# Patient Record
Sex: MALE | Race: BLACK OR AFRICAN AMERICAN | NOT HISPANIC OR LATINO | Employment: UNEMPLOYED | URBAN - METROPOLITAN AREA
[De-identification: names, ages, dates, MRNs, and addresses within clinical notes are randomized per-mention and may not be internally consistent; named-entity substitution may affect disease eponyms.]

---

## 2024-05-02 ENCOUNTER — HOSPITAL ENCOUNTER (INPATIENT)
Facility: HOSPITAL | Age: 64
LOS: 7 days | Discharge: NURSING, REHAB OR LONG-TERM CARE | DRG: 885 | End: 2024-05-09
Attending: PSYCHIATRY & NEUROLOGY | Admitting: PSYCHIATRY & NEUROLOGY
Payer: MEDICAID

## 2024-05-02 ENCOUNTER — HOSPITAL ENCOUNTER (EMERGENCY)
Facility: HOSPITAL | Age: 64
Discharge: PSYCHIATRIC OR SUBSTANCE USE FACILITY | End: 2024-05-02
Attending: EMERGENCY MEDICINE
Payer: MEDICAID

## 2024-05-02 VITALS
DIASTOLIC BLOOD PRESSURE: 99 MMHG | TEMPERATURE: 99 F | BODY MASS INDEX: 23.7 KG/M2 | WEIGHT: 175 LBS | HEIGHT: 72 IN | SYSTOLIC BLOOD PRESSURE: 165 MMHG | OXYGEN SATURATION: 97 % | RESPIRATION RATE: 18 BRPM | HEART RATE: 94 BPM

## 2024-05-02 DIAGNOSIS — F17.200 NEEDS SMOKING CESSATION EDUCATION: ICD-10-CM

## 2024-05-02 DIAGNOSIS — R45.851 SUICIDAL IDEATION: Primary | ICD-10-CM

## 2024-05-02 DIAGNOSIS — F32.A DEPRESSION: ICD-10-CM

## 2024-05-02 DIAGNOSIS — R07.9 CHEST PAIN: ICD-10-CM

## 2024-05-02 LAB
ALBUMIN SERPL-MCNC: 4.2 G/DL (ref 3.4–4.8)
ALBUMIN/GLOB SERPL: 1.3 RATIO (ref 1.1–2)
ALP SERPL-CCNC: 68 UNIT/L (ref 40–150)
ALT SERPL-CCNC: 19 UNIT/L (ref 0–55)
AMPHET UR QL SCN: NEGATIVE
APAP SERPL-MCNC: <17.4 UG/ML (ref 10–30)
APPEARANCE UR: CLEAR
AST SERPL-CCNC: 19 UNIT/L (ref 5–34)
BACTERIA #/AREA URNS AUTO: ABNORMAL /HPF
BARBITURATE SCN PRESENT UR: NEGATIVE
BASOPHILS # BLD AUTO: 0.02 X10(3)/MCL
BASOPHILS NFR BLD AUTO: 0.3 %
BENZODIAZ UR QL SCN: NEGATIVE
BILIRUB SERPL-MCNC: 0.6 MG/DL
BILIRUB UR QL STRIP.AUTO: NEGATIVE
BUN SERPL-MCNC: 23.1 MG/DL (ref 8.4–25.7)
CALCIUM SERPL-MCNC: 9.7 MG/DL (ref 8.8–10)
CANNABINOIDS UR QL SCN: NEGATIVE
CHLORIDE SERPL-SCNC: 107 MMOL/L (ref 98–107)
CO2 SERPL-SCNC: 25 MMOL/L (ref 23–31)
COCAINE UR QL SCN: POSITIVE
COLOR UR AUTO: YELLOW
CREAT SERPL-MCNC: 0.86 MG/DL (ref 0.73–1.18)
EOSINOPHIL # BLD AUTO: 0.06 X10(3)/MCL (ref 0–0.9)
EOSINOPHIL NFR BLD AUTO: 1 %
ERYTHROCYTE [DISTWIDTH] IN BLOOD BY AUTOMATED COUNT: 12.8 % (ref 11.5–17)
ETHANOL SERPL-MCNC: <10 MG/DL
FENTANYL UR QL SCN: NEGATIVE
GFR SERPLBLD CREATININE-BSD FMLA CKD-EPI: >60 MLS/MIN/1.73/M2
GLOBULIN SER-MCNC: 3.2 GM/DL (ref 2.4–3.5)
GLUCOSE SERPL-MCNC: 89 MG/DL (ref 82–115)
GLUCOSE UR QL STRIP.AUTO: NORMAL
HCT VFR BLD AUTO: 42.2 % (ref 42–52)
HGB BLD-MCNC: 14.2 G/DL (ref 14–18)
HOLD SPECIMEN: NORMAL
HYALINE CASTS #/AREA URNS LPF: ABNORMAL /LPF
IMM GRANULOCYTES # BLD AUTO: 0.01 X10(3)/MCL (ref 0–0.04)
IMM GRANULOCYTES NFR BLD AUTO: 0.2 %
KETONES UR QL STRIP.AUTO: ABNORMAL
LEUKOCYTE ESTERASE UR QL STRIP.AUTO: NEGATIVE
LYMPHOCYTES # BLD AUTO: 1.48 X10(3)/MCL (ref 0.6–4.6)
LYMPHOCYTES NFR BLD AUTO: 25.4 %
MCH RBC QN AUTO: 31.7 PG (ref 27–31)
MCHC RBC AUTO-ENTMCNC: 33.6 G/DL (ref 33–36)
MCV RBC AUTO: 94.2 FL (ref 80–94)
MDMA UR QL SCN: NEGATIVE
MONOCYTES # BLD AUTO: 0.53 X10(3)/MCL (ref 0.1–1.3)
MONOCYTES NFR BLD AUTO: 9.1 %
MUCOUS THREADS URNS QL MICRO: ABNORMAL /LPF
NEUTROPHILS # BLD AUTO: 3.72 X10(3)/MCL (ref 2.1–9.2)
NEUTROPHILS NFR BLD AUTO: 64 %
NITRITE UR QL STRIP.AUTO: NEGATIVE
NRBC BLD AUTO-RTO: 0 %
OHS QRS DURATION: 106 MS
OHS QTC CALCULATION: 488 MS
OPIATES UR QL SCN: NEGATIVE
PCP UR QL: NEGATIVE
PH UR STRIP.AUTO: 6 [PH]
PH UR: 6 [PH] (ref 3–11)
PLATELET # BLD AUTO: 306 X10(3)/MCL (ref 130–400)
PMV BLD AUTO: 9.4 FL (ref 7.4–10.4)
POTASSIUM SERPL-SCNC: 3.3 MMOL/L (ref 3.5–5.1)
PROT SERPL-MCNC: 7.4 GM/DL (ref 5.8–7.6)
PROT UR QL STRIP.AUTO: ABNORMAL
RBC # BLD AUTO: 4.48 X10(6)/MCL (ref 4.7–6.1)
RBC #/AREA URNS AUTO: ABNORMAL /HPF
RBC UR QL AUTO: ABNORMAL
SALICYLATES SERPL-MCNC: <5 MG/DL (ref 15–30)
SARS-COV-2 RDRP RESP QL NAA+PROBE: NEGATIVE
SODIUM SERPL-SCNC: 141 MMOL/L (ref 136–145)
SP GR UR STRIP.AUTO: 1.03 (ref 1–1.03)
SPECIFIC GRAVITY, URINE AUTO (.000) (OHS): 1.03 (ref 1–1.03)
SQUAMOUS #/AREA URNS LPF: ABNORMAL /HPF
UROBILINOGEN UR STRIP-ACNC: ABNORMAL
WBC # SPEC AUTO: 5.82 X10(3)/MCL (ref 4.5–11.5)
WBC #/AREA URNS AUTO: ABNORMAL /HPF

## 2024-05-02 PROCEDURE — 82077 ASSAY SPEC XCP UR&BREATH IA: CPT | Performed by: EMERGENCY MEDICINE

## 2024-05-02 PROCEDURE — 93005 ELECTROCARDIOGRAM TRACING: CPT

## 2024-05-02 PROCEDURE — 80143 DRUG ASSAY ACETAMINOPHEN: CPT | Performed by: EMERGENCY MEDICINE

## 2024-05-02 PROCEDURE — 85025 COMPLETE CBC W/AUTO DIFF WBC: CPT | Performed by: EMERGENCY MEDICINE

## 2024-05-02 PROCEDURE — 80053 COMPREHEN METABOLIC PANEL: CPT | Performed by: EMERGENCY MEDICINE

## 2024-05-02 PROCEDURE — 80307 DRUG TEST PRSMV CHEM ANLYZR: CPT | Performed by: EMERGENCY MEDICINE

## 2024-05-02 PROCEDURE — 84443 ASSAY THYROID STIM HORMONE: CPT | Performed by: PSYCHIATRY & NEUROLOGY

## 2024-05-02 PROCEDURE — 25000003 PHARM REV CODE 250: Performed by: PSYCHIATRY & NEUROLOGY

## 2024-05-02 PROCEDURE — 11400000 HC PSYCH PRIVATE ROOM

## 2024-05-02 PROCEDURE — 80179 DRUG ASSAY SALICYLATE: CPT | Performed by: EMERGENCY MEDICINE

## 2024-05-02 PROCEDURE — 87635 SARS-COV-2 COVID-19 AMP PRB: CPT | Performed by: EMERGENCY MEDICINE

## 2024-05-02 PROCEDURE — 81001 URINALYSIS AUTO W/SCOPE: CPT | Mod: XB | Performed by: EMERGENCY MEDICINE

## 2024-05-02 PROCEDURE — 99285 EMERGENCY DEPT VISIT HI MDM: CPT | Mod: 25

## 2024-05-02 RX ORDER — ALUMINUM HYDROXIDE, MAGNESIUM HYDROXIDE, AND SIMETHICONE 1200; 120; 1200 MG/30ML; MG/30ML; MG/30ML
30 SUSPENSION ORAL EVERY 6 HOURS PRN
Status: DISCONTINUED | OUTPATIENT
Start: 2024-05-02 | End: 2024-05-09 | Stop reason: HOSPADM

## 2024-05-02 RX ORDER — LORAZEPAM 2 MG/ML
2 INJECTION INTRAMUSCULAR EVERY 4 HOURS PRN
Status: DISCONTINUED | OUTPATIENT
Start: 2024-05-02 | End: 2024-05-09 | Stop reason: HOSPADM

## 2024-05-02 RX ORDER — ADHESIVE BANDAGE
30 BANDAGE TOPICAL DAILY PRN
Status: DISCONTINUED | OUTPATIENT
Start: 2024-05-02 | End: 2024-05-09 | Stop reason: HOSPADM

## 2024-05-02 RX ORDER — DIPHENHYDRAMINE HCL 50 MG
50 CAPSULE ORAL EVERY 4 HOURS PRN
Status: DISCONTINUED | OUTPATIENT
Start: 2024-05-02 | End: 2024-05-09 | Stop reason: HOSPADM

## 2024-05-02 RX ORDER — HALOPERIDOL 5 MG/ML
10 INJECTION INTRAMUSCULAR EVERY 4 HOURS PRN
Status: DISCONTINUED | OUTPATIENT
Start: 2024-05-02 | End: 2024-05-09 | Stop reason: HOSPADM

## 2024-05-02 RX ORDER — CLONIDINE HYDROCHLORIDE 0.1 MG/1
0.2 TABLET ORAL EVERY 8 HOURS PRN
Status: DISCONTINUED | OUTPATIENT
Start: 2024-05-02 | End: 2024-05-09 | Stop reason: HOSPADM

## 2024-05-02 RX ORDER — CLONIDINE HYDROCHLORIDE 0.1 MG/1
0.1 TABLET ORAL EVERY 8 HOURS PRN
Status: DISCONTINUED | OUTPATIENT
Start: 2024-05-02 | End: 2024-05-09 | Stop reason: HOSPADM

## 2024-05-02 RX ORDER — HYDROXYZINE HYDROCHLORIDE 50 MG/1
50 TABLET, FILM COATED ORAL EVERY 4 HOURS PRN
Status: DISCONTINUED | OUTPATIENT
Start: 2024-05-02 | End: 2024-05-09 | Stop reason: HOSPADM

## 2024-05-02 RX ORDER — ACETAMINOPHEN 325 MG/1
650 TABLET ORAL EVERY 6 HOURS PRN
Status: DISCONTINUED | OUTPATIENT
Start: 2024-05-02 | End: 2024-05-09 | Stop reason: HOSPADM

## 2024-05-02 RX ORDER — IBUPROFEN 200 MG
1 TABLET ORAL DAILY
Status: DISCONTINUED | OUTPATIENT
Start: 2024-05-02 | End: 2024-05-02

## 2024-05-02 RX ORDER — LORAZEPAM 1 MG/1
2 TABLET ORAL EVERY 4 HOURS PRN
Status: DISCONTINUED | OUTPATIENT
Start: 2024-05-02 | End: 2024-05-09 | Stop reason: HOSPADM

## 2024-05-02 RX ORDER — DIPHENHYDRAMINE HYDROCHLORIDE 50 MG/ML
50 INJECTION INTRAMUSCULAR; INTRAVENOUS EVERY 4 HOURS PRN
Status: DISCONTINUED | OUTPATIENT
Start: 2024-05-02 | End: 2024-05-09 | Stop reason: HOSPADM

## 2024-05-02 RX ORDER — HALOPERIDOL 5 MG/1
10 TABLET ORAL EVERY 4 HOURS PRN
Status: DISCONTINUED | OUTPATIENT
Start: 2024-05-02 | End: 2024-05-09 | Stop reason: HOSPADM

## 2024-05-02 RX ORDER — ONDANSETRON 4 MG/1
4 TABLET, ORALLY DISINTEGRATING ORAL EVERY 6 HOURS PRN
Status: DISCONTINUED | OUTPATIENT
Start: 2024-05-02 | End: 2024-05-09 | Stop reason: HOSPADM

## 2024-05-02 RX ORDER — TRAZODONE HYDROCHLORIDE 100 MG/1
100 TABLET ORAL NIGHTLY PRN
Status: DISCONTINUED | OUTPATIENT
Start: 2024-05-02 | End: 2024-05-09 | Stop reason: HOSPADM

## 2024-05-02 RX ADMIN — TRAZODONE HYDROCHLORIDE 100 MG: 100 TABLET ORAL at 08:05

## 2024-05-02 RX ADMIN — ALUMINUM HYDROXIDE, MAGNESIUM HYDROXIDE, AND SIMETHICONE 30 ML: 200; 200; 20 SUSPENSION ORAL at 12:05

## 2024-05-02 RX ADMIN — CLONIDINE HYDROCHLORIDE 0.1 MG: 0.1 TABLET ORAL at 12:05

## 2024-05-02 NOTE — PLAN OF CARE
Behavioral Health Unit  Psychosocial History and Assessment  Progress Note      Patient Name: Oli Sanderson YOB: 1960 SW: Keerthi Sanders Date: 2024    Chief Complaint: addictive disorder and depression    Consent:     Did the patient consent for an interview with the ? Yes    Did the patient consent for the  to contact family/friend/caregiver?   Yes  Name: Zoey Coffey, Relationship: sister, and Contact: [phone].    Did the patient give consent for the  to inform family/friend/caregiver of his/her whereabouts or to discuss discharge planning? Yes    Source of Information: Face to face with patient    Is information obtained from interviews considered reliable?   yes    Reason for Admission:     There are no hospital problems to display for this patient.      History of Present Illness - (Patient Perception):   I've been depressed, smoking crack, and drinking. I live by myself, got a nice house, but I'm lonely. I don't tell nobody, because everybody got their own things going on. I feel like I got stuck in that house. I realize I'm turning 64, about to get my nursing home and I can't keep smoking. I need help. I was smoking every day, I was 89, now I'm 89. I'm not doing nothing in the house, its starting to go down. I left my good friends for the dark friends. I don't think I really wanted to kill myself, I just didn't care. I drink like a 6-pack a day. It's hard, I know I can't do this by myself.     Present biopsychosocial functioning: quiet, depressed    Past biopsychosocial functioning: history of depression    Family and Marital/Relationship History:     Significant Other/Partner Relationships:  Single:  6 children, 1     Family Relationships: Strained and I don't like to bother them; they have their own stuff going on      Childhood History:     Where was [place] raised[place]    Who raised the patient? parents      How does patient describe  their childhood? great      Who is patient's primary support person? sister      Culture and Christianity:     Christianity: Jehovah's witness    How strong of a role does Orthodox and spirituality play in patient's life? Not strong    Taoism or spiritual concerns regarding treatment: observation of holy days  and spiritual concerns / distress    History of Abuse:   History of Abuse: Denies      Outcome: denies    Psychiatric and Medical History:     History of psychiatric illness or treatment: prior inpatient treatment and prior suicide attempt(s)    Medical history:   Past Medical History:   Diagnosis Date    Hypertension        Substance Abuse History:     Alcohol - (Patient Perspective): pt states that he drinks a six pack of beer daily  Social History     Substance and Sexual Activity   Alcohol Use Yes    Comment: DAILY BEER       Drugs - (Patient Perspective): Pt states that he has been smoking crack daily  Social History     Substance and Sexual Activity   Drug Use Yes    Types: Cocaine    Comment: DAILY       Education:     Currently Enrolled? No  High School (9-12) or GED dropped out in 9th grade    Special Education? No    Interested in Completing Education/GED: No    Employment and Financial:     Currently employed? Employed: Current Occupation: [employer]    Source of Income: salary    Able to afford basic needs (food, shelter, utilities)? Yes    Who manages finances/personal affairs? self      Service:     [place]? no    Combat Service? No     Community Resources:     Describe present use of community resources: inpatient mental health     Identify previously used community resources   (Include previous mental health treatment - outpatient and inpatient): inpatient mental health    Environmental:     Current living situation:Lives alone [address], [place]    Social Evaluation:     Patient Assets: General fund of knowledge, Motivation for treatment/growth, and Capable of independent living    Patient  Limitations: poor coping skills; potential for relapse    High risk psychosocial issues that may impact discharge planning:   inability to afford medications or follow up outpatient treatment    Recommendations:     Anticipated discharge plan:   Pt is requesting Rehab; does not currently have medicaid    High risk issues requiring early treatment planning and immediate intervention: none at this time    Community resources needed for discharge planning:  aftercare treatment sources    Anticipated social work role(s) in treatment and discharge planning: advice and [place], groups, individual as needed, and referral to aftercare.    05/02/24 1136   Initial Information   Source of Information patient   Reason for Admission deression, SI   Patient Aware of Diagnosis yes   Arrived From emergency department   Spiritual Beliefs   Spiritual, Cultural Beliefs, Mandaeism Practices, Values that Affect Care yes   Description of Beliefs that Will Affect Care Gnosticist   Malnutrition Screening Tool   Have you recently lost weight without trying? 3   Weight loss score 3   Have you been eating poorly because of a decreased appetite? 1   Appetite score 1   MST Score 4   Substance Use/Withdrawal   Substance Use Current, used prior to admission   Additional Tobacco Use   How many cigarettes do you typically have per day? 1   Abuse Screen (yes response referral indicated)   Feels Unsafe at Home or Work/School no   Feels Threatened by Someone no   Does anyone try to keep you from having contact with others or doing things outside your home? no   Physical Signs of Abuse Present no   Abuse Details   Physical Abuse No   Sexual Abuse No   Emotional Abuse No   If applicable, has the abuse been reported? No   AUDIT-C (Alcohol Use Disorders ID Test)   Alcohol Use In Past Year 4-->four or more times a week   Alcohol Amount Per Day In Past Year 2-->five or six   More Than 6 Drinks On One Occasion In Past Year 4-->daily or almost daily   Total  Audit C Score 10

## 2024-05-02 NOTE — NURSING
"Admission Note:    Oli Sanderson is a 63 y.o. male, : 1960, MRN: [id number], admitted on 2024 to [facility] ([facility]) for Boogie Lees MD with a diagnosis of Depression [F32.A]. Patient admitted on a status of Physician Emergency Certificate (PEC). Oli reports no known food or drug allergies.    Patient demonstrated an affect that was sad, flat, and anxious. Patient demonstrated mood during assessment that was depressed and anxious. Patient had an appearance that was disheveled.  Patient endorses suicidal ideation. Patient endorses suicide plan. He informed ER Staff that he had consumed 27 OTC sleep aid. He had taken these pills at 3AM. He has been monitored in ER for 7 hours and levels are WNL. Patient denies hallucinations.    Oli's  height is 7' 5" (2.261 m) and weight is 70.9 kg (156 lb 3.2 oz). His temperature is 98.2 °F (36.8 °C). His blood pressure is 160/100 (abnormal) and his pulse is 80. His respiration is 20.     Oli's last BM was noted on: 24.    Metal detector screening performed via security personnel. The result of the scan was no contraband found. Head-to-toe physical assessment completed with the following findings: no wounds found upon body screen. A full skin assessment was performed. Oli's skin appeared warm, dry, and intact.  Oli was oriented to unit, staff, peers, and room. Patient belongings/valuables stored in locked intake room cabinet and changes of clothing provided to patient. Oli was placed on Q 15 min observations.       "

## 2024-05-02 NOTE — H&P
"[facility] - Behavioral Health Unit  History & Physical    Subjective:      Chief Complaint/Reason for Admission: suicidal ideations     Oli Sanderson is a 63 y.o. male. Suicidal ideation/ crack cocaine/ alcohol abuse     Past Medical History:   Diagnosis Date    Hypertension      No past surgical history on file.  No family history on file.  Social History     Tobacco Use    Smoking status: Every Day     Current packs/day: 0.10     Types: Cigarettes    Smokeless tobacco: Never   Substance Use Topics    Alcohol use: Yes     Alcohol/week: 6.0 standard drinks of alcohol     Types: 6 Cans of beer per week     Comment: DAILY BEER    Drug use: Yes     Types: Cocaine, "Crack" cocaine     Comment: DAILY       Current Facility-Administered Medications   Medication Dose Route Frequency Provider Last Rate Last Admin    acetaminophen tablet 650 mg  650 mg Oral Q6H PRN Boogie Lees MD        aluminum-magnesium hydroxide-simethicone 200-200-20 mg/5 mL suspension 30 mL  30 mL Oral Q6H PRN Boogie Lees MD        cloNIDine tablet 0.1 mg  0.1 mg Oral Q8H PRBoogie Farley MD        cloNIDine tablet 0.2 mg  0.2 mg Oral Q8H PRN Boogie Lees MD        haloperidoL tablet 10 mg  10 mg Oral Q4H PRBoogie Farley MD        And    diphenhydrAMINE capsule 50 mg  50 mg Oral Q4H PRBoogie Farley MD        And    LORazepam tablet 2 mg  2 mg Oral Q4H PRBoogie Farley MD        And    haloperidol lactate injection 10 mg  10 mg Intramuscular Q4H PRBoogie Farley MD        And    diphenhydrAMINE injection 50 mg  50 mg Intramuscular Q4H PRBoogie Farley MD        And    LORazepam injection 2 mg  2 mg Intramuscular Q4H PRBoogie Farley MD        hydrOXYzine tablet 50 mg  50 mg Oral Q4H PRBoogie Farley MD        magnesium hydroxide 400 mg/5 ml suspension 2,400 mg  30 mL Oral Daily PRN Boogie Lees MD        ondansetron " disintegrating tablet 4 mg  4 mg Oral Q6H PRN Boogie Lees MD        traZODone tablet 100 mg  100 mg Oral Nightly PRN Boogie Lees MD         Review of patient's allergies indicates:  No Known Allergies     Review of Systems   Constitutional: Negative.    HENT: Negative.     Eyes: Negative.    Respiratory: Negative.     Cardiovascular: Negative.    Gastrointestinal: Negative.    Genitourinary: Negative.    Musculoskeletal: Negative.    Skin: Negative.    Neurological: Negative.    Endo/Heme/Allergies: Negative.    Psychiatric/Behavioral:  Positive for depression, substance abuse and suicidal ideas. Negative for hallucinations.        Objective:      Vital Signs (Most Recent)  Temp: 98.2 °F (36.8 °C) (05/02/24 1102)  Pulse: 80 (05/02/24 1102)  Resp: 20 (05/02/24 1102)  BP: (!) 160/100 (05/02/24 1102)    Vital Signs Range (Last 24H):  Temp:  [98.2 °F (36.8 °C)-98.8 °F (37.1 °C)]   Pulse:  [70-94]   Resp:  [18-20]   BP: (150-165)/()   SpO2:  [97 %-98 %]     Physical Exam  HENT:      Head: Normocephalic.      Right Ear: Tympanic membrane normal.      Left Ear: Tympanic membrane normal.      Nose: Nose normal.      Mouth/Throat:      Mouth: Mucous membranes are moist.   Eyes:      Extraocular Movements: Extraocular movements intact.      Pupils: Pupils are equal, round, and reactive to light.   Cardiovascular:      Rate and Rhythm: Normal rate and regular rhythm.   Pulmonary:      Effort: Pulmonary effort is normal.   Abdominal:      General: Abdomen is flat.   Musculoskeletal:         General: Normal range of motion.   Skin:     General: Skin is warm.   Neurological:      General: No focal deficit present.      Mental Status: He is alert and oriented to person, place, and time.      Comments: Vision normal   Hearing normal   EOM intact   Face muscles normal  Facial sensation normal   Shrugs shoulders  Tongue midline            Data Review:    Recent Results (from the past 48 hour(s))   EKG  12-lead (Chest Pain) Age >30    Collection Time: 05/02/24  7:42 AM   Result Value Ref Range    QRS Duration 106 ms    [facility] QTC Calculation 488 ms   COVID-19 Rapid Screening    Collection Time: 05/02/24  7:59 AM   Result Value Ref Range    SARS COV-2 MOLECULAR Negative Negative   Ethanol    Collection Time: 05/02/24  8:03 AM   Result Value Ref Range    Ethanol Level <10.0 <=10.0 mg/dL   Salicylate Level    Collection Time: 05/02/24  8:03 AM   Result Value Ref Range    Salicylate Level <5.0 (L) 15.0 - 30.0 mg/dL   Acetaminophen Level    Collection Time: 05/02/24  8:03 AM   Result Value Ref Range    Acetaminophen Level <17.4 10.0 - 30.0 ug/ml   Comprehensive Metabolic Panel    Collection Time: 05/02/24  8:03 AM   Result Value Ref Range    Sodium Level 141 136 - 145 mmol/L    Potassium Level 3.3 (L) 3.5 - 5.1 mmol/L    Chloride 107 98 - 107 mmol/L    Carbon Dioxide 25 23 - 31 mmol/L    Glucose Level 89 82 - 115 mg/dL    Blood Urea Nitrogen 23.1 8.4 - 25.7 mg/dL    Creatinine 0.86 0.73 - 1.18 mg/dL    Calcium Level Total 9.7 8.8 - 10.0 mg/dL    Protein Total 7.4 5.8 - 7.6 gm/dL    Albumin Level 4.2 3.4 - 4.8 g/dL    Globulin 3.2 2.4 - 3.5 gm/dL    Albumin/Globulin Ratio 1.3 1.1 - 2.0 ratio    Bilirubin Total 0.6 <=1.5 mg/dL    Alkaline Phosphatase 68 40 - 150 unit/L    Alanine Aminotransferase 19 0 - 55 unit/L    Aspartate Aminotransferase 19 5 - 34 unit/L    eGFR >60 mls/min/1.73/m2   CBC with Differential    Collection Time: 05/02/24  8:03 AM   Result Value Ref Range    WBC 5.82 4.50 - 11.50 x10(3)/mcL    RBC 4.48 (L) 4.70 - 6.10 x10(6)/mcL    Hgb 14.2 14.0 - 18.0 g/dL    Hct 42.2 42.0 - 52.0 %    MCV 94.2 (H) 80.0 - 94.0 fL    MCH 31.7 (H) 27.0 - 31.0 pg    MCHC 33.6 33.0 - 36.0 g/dL    RDW 12.8 11.5 - 17.0 %    Platelet 306 130 - 400 x10(3)/mcL    MPV 9.4 7.4 - 10.4 fL    Neut % 64.0 %    Lymph % 25.4 %    Mono % 9.1 %    Eos % 1.0 %    Basophil % 0.3 %    Lymph # 1.48 0.6 - 4.6 x10(3)/mcL    Neut # 3.72 2.1 - 9.2  x10(3)/mcL    Mono # 0.53 0.1 - 1.3 x10(3)/mcL    Eos # 0.06 0 - 0.9 x10(3)/mcL    Baso # 0.02 <=0.2 x10(3)/mcL    IG# 0.01 0 - 0.04 x10(3)/mcL    IG% 0.2 %    NRBC% 0.0 %   Light Blue Top Hold    Collection Time: 05/02/24  8:06 AM   Result Value Ref Range    Extra Tube Hold for add-ons.    Gold Top Hold    Collection Time: 05/02/24  8:06 AM   Result Value Ref Range    Extra Tube Hold for add-ons.    Pink Top Hold    Collection Time: 05/02/24  8:06 AM   Result Value Ref Range    Extra Tube Hold for add-ons.    Light Green Top Hold    Collection Time: 05/02/24  8:08 AM   Result Value Ref Range    Extra Tube Hold for add-ons.    Drug Screen, Urine    Collection Time: 05/02/24  8:27 AM   Result Value Ref Range    Amphetamines, Urine Negative Negative    Barbituates, Urine Negative Negative    Benzodiazepine, Urine Negative Negative    Cannabinoids, Urine Negative Negative    Cocaine, Urine Positive (A) Negative    Fentanyl, Urine Negative Negative    MDMA, Urine Negative Negative    Opiates, Urine Negative Negative    Phencyclidine, Urine Negative Negative    pH, Urine 6.0 3.0 - 11.0    Specific Gravity, Urine Auto 1.034 1.001 - 1.035   Urinalysis, Reflex to Urine Culture    Collection Time: 05/02/24  8:27 AM    Specimen: Urine   Result Value Ref Range    Color, UA Yellow Yellow, Light-Yellow, Dark Yellow, Neha, Straw    Appearance, UA Clear Clear    Specific Gravity, UA 1.034 (H) 1.005 - 1.030    pH, UA 6.0 5.0 - 8.5    Protein, UA Trace (A) Negative    Glucose, UA Normal Negative, Normal    Ketones, UA Trace (A) Negative    Blood, UA 2+ (A) Negative    Bilirubin, UA Negative Negative    Urobilinogen, UA 1+ (A) 0.2, 1.0, Normal    Nitrites, UA Negative Negative    Leukocyte Esterase, UA Negative Negative    WBC, UA 0-5 None Seen, 0-2, 3-5, 0-5 /HPF    Bacteria, UA Trace (A) None Seen /HPF    Squamous Epithelial Cells, UA None Seen None Seen /HPF    Mucous, UA Many (A) None Seen /LPF    Hyaline Casts, UA 0-2 (A) None  Seen /lpf    RBC, UA 21-50 (A) None Seen, 0-2, 3-5, 0-5 /HPF        No results found.       Assessment and Plan       Substance abuse  Major depression

## 2024-05-02 NOTE — PLAN OF CARE
Problem: Adult Behavioral Health Plan of Care  Goal: Plan of Care Review  Outcome: Not Progressing  Goal: Patient-Specific Goal (Individualization)  Outcome: Not Progressing  Goal: Adheres to Safety Considerations for Self and Others  Outcome: Not Progressing  Goal: Absence of New-Onset Illness or Injury  Outcome: Not Progressing  Goal: Optimized Coping Skills in Response to Life Stressors  Outcome: Not Progressing  Goal: Develops/Participates in Therapeutic [place] to Support Successful Transition  Outcome: Not Progressing  Goal: Rounds/Family Conference  Outcome: Not Progressing     Problem: Depressive Signs/Symptoms  Goal: Optimized Energy Level (Depressive Signs/Symptoms)  Outcome: Not Progressing  Goal: Optimized Cognitive Function (Depressive Signs/Symptoms)  Outcome: Not Progressing  Goal: Increased Participation and Engagement (Depressive Signs/Symptoms)  Outcome: Not Progressing  Goal: Enhanced Self-Esteem and Confidence (Depressive Signs/Symptoms)  Outcome: Not Progressing  Goal: Improved Mood Symptoms (Depressive Signs/Symptoms)  Outcome: Not Progressing  Goal: Optimized Nutrition Intake (Depressive Signs/Symptoms)  Outcome: Not Progressing  Goal: Improved Psychomotor Symptoms (Depressive Signs/Symptoms)  Outcome: Not Progressing  Goal: Improved Sleep (Depressive Signs/Symptoms)  Outcome: Not Progressing  Goal: Enhanced Social, Occupational or Functional Skills (Depressive Signs/Symptoms)  Outcome: Not Progressing     Problem: Excessive Substance Use  Goal: Optimized Energy Level (Excessive Substance Use)  Outcome: Not Progressing  Goal: Improved Behavioral Control (Excessive Substance Use)  Outcome: Not Progressing  Goal: Increased Participation and Engagement (Excessive Substance Use)  Outcome: Not Progressing  Goal: Improved Physiologic Symptoms (Excessive Substance Use)  Outcome: Not Progressing  Goal: Enhanced Social, Occupational or Functional Skills (Excessive Substance Use)  Outcome: Not  Progressing

## 2024-05-02 NOTE — ED PROVIDER NOTES
"Encounter Date: 5/2/2024       History     Chief Complaint   Patient presents with    Suicide Attempt     Pt in /[facility] w report of taking 27 OTC sleeping pills approx. 4 hrs ago. Pt stated "I just wanted to sleep and not wake up"  -HI-AH-.  PT A&O X 4, CO CP.  EKG OBTAINED.   PT WANDED.      Patient is here endorsing suicidal ideation with plan to overdose; patient's cognition sufficient to enable access to these means; affect is rather flat.  There is previous somewhat distant previous admission for suicidal ideation.  Patient recalls that admission was helpful to.  Patient is without nausea, vomiting, abdominal.  Patient is otherwise without somatic complaints of any kind.        Review of patient's allergies indicates:  No Known Allergies  Past Medical History:   Diagnosis Date    Hypertension      No past surgical history on file.  No family history on file.  Social History     Tobacco Use    Smoking status: Every Day     Current packs/day: 1.00     Types: Cigarettes    Smokeless tobacco: Never   Substance Use Topics    Alcohol use: Yes     Comment: DAILY BEER    Drug use: Yes     Types: Cocaine     Comment: DAILY     Review of Systems    Physical Exam     Initial Vitals [05/02/24 0746]   BP Pulse Resp Temp SpO2   (!) 150/92 70 18 98.8 °F (37.1 °C) 98 %      MAP       --         Physical Exam    Nursing note and vitals reviewed.  Constitutional: He appears well-developed and well-nourished. He is not diaphoretic. No distress.   HENT:   Head: Normocephalic and atraumatic.   Eyes: EOM are normal. Pupils are equal, round, and reactive to light. Right eye exhibits no discharge. Left eye exhibits no discharge.   Neck: Neck supple. No thyromegaly present. No tracheal deviation present. No JVD present.   Normal range of motion.  Cardiovascular:  Normal rate, regular rhythm, normal heart sounds and intact distal pulses.           No murmur heard.  Pulmonary/Chest: Breath sounds normal. No stridor. No respiratory " distress. He has no wheezes. He has no rhonchi. He has no rales.   Abdominal: Abdomen is soft. He exhibits no distension. There is no abdominal tenderness. There is no rebound and no guarding.   Musculoskeletal:         General: No tenderness or edema. Normal range of motion.      Cervical back: Normal range of motion and neck supple.     Neurological: He is alert and oriented to person, place, and time. He has normal strength. No cranial nerve deficit. GCS score is 15. GCS eye subscore is 4. GCS verbal subscore is 5. GCS motor subscore is 6.   Skin: Skin is warm and dry. Capillary refill takes less than 2 seconds. No rash and no abscess noted. No erythema. No pallor.   Psychiatric:   Suicidal ideation, plan intact, access to means; judgment and impulse control sufficiently impaired patient unable to contract for safety;         ED Course   Procedures  Labs Reviewed   SALICYLATE LEVEL - Abnormal; Notable for the following components:       Result Value    Salicylate Level <5.0 (*)     All other components within normal limits   COMPREHENSIVE METABOLIC PANEL - Abnormal; Notable for the following components:    Potassium Level 3.3 (*)     All other components within normal limits   CBC WITH DIFFERENTIAL - Abnormal; Notable for the following components:    RBC 4.48 (*)     MCV 94.2 (*)     MCH 31.7 (*)     All other components within normal limits   SARS-COV-2 RNA AMPLIFICATION, QUAL - Normal    Narrative:     The IDNOW COVID-19 assay is a rapid molecular in vitro diagnostic test utilizing an isothermal nucleic acid amplification technology intended for the qualitative detection of nucleic acid from the SARS-CoV-2 viral RNA in direct nasal, nasopharyngeal or throat swabs from individuals who are suspected of COVID-19 by their healthcare provider.   ALCOHOL,MEDICAL (ETHANOL) - Normal   ACETAMINOPHEN LEVEL - Normal   CBC W/ AUTO DIFFERENTIAL    Narrative:     The following orders were created for panel order CBC Auto  Differential.  Procedure                               Abnormality         Status                     ---------                               -----------         ------                     CBC with Differential[[id number]]       Abnormal            Final result                 Please view results for these tests on the individual orders.   DRUG SCREEN, URINE (BEAKER)   URINALYSIS, REFLEX TO URINE CULTURE   EXTRA TUBES    Narrative:     The following orders were created for panel order EXTRA TUBES.  Procedure                               Abnormality         Status                     ---------                               -----------         ------                     Light Blue Top Hold[[id number]]                             In process                 Gold Top Hold[[id number]]                                   In process                 Pink Top Hold[[id number]]                                   In process                   Please view results for these tests on the individual orders.   LIGHT BLUE TOP HOLD   GOLD TOP HOLD   PINK TOP HOLD   EXTRA TUBES    Narrative:     The following orders were created for panel order EXTRA TUBES.  Procedure                               Abnormality         Status                     ---------                               -----------         ------                     Light Green Top Hold[[id number]]                            In process                   Please view results for these tests on the individual orders.   LIGHT GREEN TOP HOLD     EKG Readings: (Independently Interpreted)   NSR @ 86, non acute, non ischemic, normal intervals ;     ECG Results              EKG 12-lead (Chest Pain) Age >30 (In process)        Collection Time Result Time QRS Duration OHS QTC Calculation    05/02/24 07:42:34 05/02/24 07:45:14 106 488                     In process by Interface, [facility] (05/02/24 07:45:17)                   Narrative:    Test Reason : R07.9,    Vent. Rate : 086 BPM      Atrial Rate : 086 BPM     P-R Int : 140 ms          QRS Dur : 106 ms      QT Int : 408 ms       P-R-T Axes : 004 049 080 degrees     QTc Int : 488 ms    Normal sinus rhythm  Possible Left atrial enlargement  LVH  Nonspecific ST and T wave abnormality  Prolonged QT  Abnormal ECG  No previous ECGs available    Referred By:             Confirmed By:                                   Imaging Results    None          Medications - No data to display  Medical Decision Making  Patient here with findings most compatible with suicidal ideation.  Differential diagnosis includes decompensated mental health condition, consequences of polysubstance abuse, organic decompensation, others ....    Amount and/or Complexity of Data Reviewed  External Data Reviewed: notes.     Details: Records confirm history of a previous psychiatric decompensation and subsequent admission;  Labs: ordered. Decision-making details documented in ED Course.     Details: As above;  ECG/medicine tests: ordered and independent interpretation performed. Decision-making details documented in ED Course.     Details: NSR @ 86, non acute, non ischemic, normal intervals ;  Discussion of management or test interpretation with external provider(s): Formal mental health evaluation;    Risk  Decision regarding hospitalization.  Risk Details: Risk found sufficient to warrant admission for formal mental health evaluation; patient achieves medical clearance and is referred for formal mental health evaluation; transferred in stable condition without event.               ED Course as of 05/02/24 0840   Thu May 02, 2024   0839 Negative salicylates, negative Tylenol, negative ethanol; [CT]   0839 Generally reassuring chemistries; [CT]   0839 Negative Abbott now; [CT]   0839 Reassuring hemogram; [CT]      ED Course User Index  [CT] Sean Carvajal MD       Medically cleared for psychiatry placement: 5/2/2024  8:40 AM                   Clinical Impression:  Final  diagnoses:  [R07.9] Chest pain  [R45.851] Suicidal ideation (Primary)          ED Disposition Condition    Transfer to Psych Facility Stable          ED Prescriptions    None       Follow-up Information    None          Sean Carvajal MD  05/02/24 0840

## 2024-05-02 NOTE — PROGRESS NOTES
05/02/24 1500   [facility]   Group Name Therapeutic Recreation   Specific Interventions Skilled Activity Creative Expression   Participation Level Active;Supportive;Appropriate;Attentive;Sharing   Participation Quality Cooperative;Social   Insight/Motivation Limited;Applies New Skills   Affect/Mood Display Appropriate   Cognition Oriented;Alert   Psychomotor WNL

## 2024-05-02 NOTE — NURSING
Patient given Aluminum magnesium hydroxide simethicone at 1230 pm for complaints of indigestion, acid like feeling in stomach . An hour after medication given stated relief.

## 2024-05-02 NOTE — PLAN OF CARE
05/02/24 1444   [facility] Group Therapy   Group Name Medication   Specific Interventions Other (see comments)  (Medication Adherence)   Participation Level Minimal   Participation Quality Lack of Interest   Insight/Motivation None   Affect/Mood Display Flat   Cognition Pre-Occupied   Psychomotor WNL

## 2024-05-03 LAB
CHOLEST SERPL-MCNC: 161 MG/DL
CHOLEST/HDLC SERPL: 3 {RATIO} (ref 0–5)
EST. AVERAGE GLUCOSE BLD GHB EST-MCNC: 91.1 MG/DL
GLUCOSE P FAST SERPL-MCNC: 84 MG/DL (ref 70–100)
HBA1C MFR BLD: 4.8 %
HDLC SERPL-MCNC: 61 MG/DL (ref 35–60)
LDLC SERPL CALC-MCNC: 86 MG/DL (ref 50–140)
T PALLIDUM AB SER QL: REACTIVE
TRIGL SERPL-MCNC: 68 MG/DL (ref 34–140)
TSH SERPL-ACNC: 0.95 UIU/ML (ref 0.35–4.94)
VLDLC SERPL CALC-MCNC: 14 MG/DL

## 2024-05-03 PROCEDURE — 86592 SYPHILIS TEST NON-TREP QUAL: CPT | Performed by: PSYCHIATRY & NEUROLOGY

## 2024-05-03 PROCEDURE — 82947 ASSAY GLUCOSE BLOOD QUANT: CPT | Performed by: PSYCHIATRY & NEUROLOGY

## 2024-05-03 PROCEDURE — 25000003 PHARM REV CODE 250: Performed by: PSYCHIATRY & NEUROLOGY

## 2024-05-03 PROCEDURE — 86780 TREPONEMA PALLIDUM: CPT | Performed by: PSYCHIATRY & NEUROLOGY

## 2024-05-03 PROCEDURE — 80061 LIPID PANEL: CPT | Performed by: PSYCHIATRY & NEUROLOGY

## 2024-05-03 PROCEDURE — 83036 HEMOGLOBIN GLYCOSYLATED A1C: CPT | Performed by: PSYCHIATRY & NEUROLOGY

## 2024-05-03 PROCEDURE — 11400000 HC PSYCH PRIVATE ROOM

## 2024-05-03 PROCEDURE — 25000003 PHARM REV CODE 250

## 2024-05-03 RX ORDER — ESCITALOPRAM OXALATE 10 MG/1
10 TABLET ORAL DAILY
Status: DISCONTINUED | OUTPATIENT
Start: 2024-05-03 | End: 2024-05-06

## 2024-05-03 RX ADMIN — HYDROXYZINE HYDROCHLORIDE 50 MG: 50 TABLET, FILM COATED ORAL at 10:05

## 2024-05-03 RX ADMIN — ALUMINUM HYDROXIDE, MAGNESIUM HYDROXIDE, AND SIMETHICONE 30 ML: 200; 200; 20 SUSPENSION ORAL at 04:05

## 2024-05-03 RX ADMIN — ALUMINUM HYDROXIDE, MAGNESIUM HYDROXIDE, AND SIMETHICONE 30 ML: 200; 200; 20 SUSPENSION ORAL at 08:05

## 2024-05-03 RX ADMIN — TRAZODONE HYDROCHLORIDE 100 MG: 100 TABLET ORAL at 08:05

## 2024-05-03 RX ADMIN — HYDROXYZINE HYDROCHLORIDE 50 MG: 50 TABLET, FILM COATED ORAL at 08:05

## 2024-05-03 RX ADMIN — ESCITALOPRAM OXALATE 10 MG: 10 TABLET ORAL at 11:05

## 2024-05-03 RX ADMIN — CLONIDINE HYDROCHLORIDE 0.2 MG: 0.1 TABLET ORAL at 04:05

## 2024-05-03 RX ADMIN — ACETAMINOPHEN 650 MG: 325 TABLET, FILM COATED ORAL at 08:05

## 2024-05-03 NOTE — CARE UPDATE
Due to pt not having insurance at this time transition of care will be set up with [facility].  is actively working with belen rep on pt belen eligibility.

## 2024-05-03 NOTE — PROGRESS NOTES
05/03/24 1400   [facility] Group Therapy   Group Name Therapeutic Recreation   Specific Interventions Skilled Activity Leisure Education and Awareness   Participation Level Active;Supportive;Appropriate;Attentive;Sharing   Participation Quality Cooperative;Social   Insight/Motivation Improved;Applies New Skills   Affect/Mood Display Appropriate;Bright   Cognition Oriented;Alert   Psychomotor WNL

## 2024-05-03 NOTE — NURSING
"PRN Administration Note:    Behavior:    Patient (Oli Sanderson is a 63 y.o. male, : 1960, MRN: [id number])     Allergies: Patient has no known allergies.    Oli's  height is 7' 5" (2.261 m) and weight is 70.9 kg (156 lb 3.2 oz). His temperature is 98.6 °F (37 °C). His blood pressure is 131/95 (abnormal) and his pulse is 87. His respiration is 16 and oxygen saturation is 96%.     Reason for PRN Administration: headache and heartburn.    Intervention:    Administered Tylenol 650 mg PO PRN and Maalox 30 mg PO PRN per physician order to Oli       Response:    Oli tolerated administration well.      Plan:     Continue to monitor per MD/PA/APRN orders; and reevaluate medication effectiveness within 30 minutes.    "

## 2024-05-03 NOTE — PROGRESS NOTES
Oli is a 64y/o male admitted for Major Depressive Disorder, Recurrent: Severe Without Psychotic Features (F33.2) and Alcohol Abuse Without Physiological Dependence with a uds +cocaine. CTRS met with Pt 1:1, Oli was cooperative, appearing depressed reporting low self-esteem and ability to perform his ADL's. CTRS educated Pt to TR group times and dates, with Oli agreeing to attend TR groups. Oli reported his treatment goals as get sober and going long term(rehab).       05/02/24 1418   General   Admit Date 05/02/24   Primary Diagnosis Major Depressive Disorder, Recurrent: Severe Without Psychotic Features (F33.2)   Secondary Diagnosis Alcohol Abuse Without Physiological Dependence   Z[employer]ism [employer]   Number of Children 7   Children Living? 6   Occupation unemployed   Does the patient have dentures? No   If you were to take part in activities, which of the following would you prefer? Activities that would bring you together with other patients   Do you feel like you have enough to keep you busy now? Yes   Do you believe that you have the opportunity for physical activity? Yes   Activity Capabilities Moderate   Subjective   Patient states Drugs and took some pills, suicide, I was depressed   Assessment   Mobility ambulates independently   Transfers independently   Musculoskeletal pain  (c/o B LE pain)   Visual Acuity normal vision   Visual Perception depth perception;color perception;recognizes letters;recognizes numbers   Hearing normal   Speech/Communication normal   Cognitive Concerns oriented x4   Emotional Concerns appears depressed   Leisure Interest Survey   Leisure Interest Survey Yes   Creative Activities   Cooking Current Interest   Outdoor Activities   Hunting Current Interest   Picnics/Cookouts Current Interest   Fishing Current Interest   Horseback Riding Current Interest   Spectator Events   Concerts Past Interest   Plays Past Interest   Movies Past Interest   Sporting Events Past Interest    Passive Games   Classic Board Games Current Interest   Other Passive Games   (table games)   Goals   Additional Documentation yes   Goal Formulation With patient   Time For Goal Achievement 7 days   Goal 1 Get sober and go to long term (rehab)   Goal 1-Progress ongoing-   Plan   Planned Therapy Intervention Group Recreational Therapy   Expected Length of Stay 5-7days   PT Frequency Minimum of 3 visits per week

## 2024-05-03 NOTE — PROGRESS NOTES
Inpatient Nutrition Assessment    Admit Date: 5/2/2024   Total duration of encounter: 1 day   Patient Age: 63 y.o.    Nutrition Recommendation/Prescription     Continue Regular diet, Double portions as tolerated  Boost Plus TID (provides 360 kcal, 14 g protein per serving)  Consider daily MVI, folic acid, thiamine supplementation -- etoh use  Monitor po intake, wt, labs    Communication of Recommendations: reviewed with nurse and reviewed with patient    Nutrition Assessment     Malnutrition Assessment/Nutrition-Focused Physical Exam    Malnutrition Context: social/environmental circumstances (05/03/24 1212)  Malnutrition Level:  (Unable to determine) (05/03/24 1212)  Energy Intake (Malnutrition): less than or equal to 75% for greater than or equal to 1 month (05/03/24 1212)  Weight Loss (Malnutrition):  (Unable to determine) (05/03/24 1212)  Subcutaneous Fat (Malnutrition):  (Unable to determine) (05/03/24 1212)           Muscle Mass (Malnutrition):  (Unable to determine) (05/03/24 1212)                                   A minimum of two characteristics is recommended for diagnosis of either severe or non-severe malnutrition.    Chart Review    Reason Seen: malnutrition screening tool (MST)    Malnutrition Screening Tool Results   Have you recently lost weight without trying?: Yes: 24-33 lbs  Have you been eating poorly because of a decreased appetite?: Yes   MST Score: 4     Diagnosis: SUBSTANCE-RELATED DISORDERS; Alcohol-Related Disorders; Alcohol Abuse Without Physiological Dependence  and MOOD DISORDERS; Major Depressive Disorder, Recurrent: Severe Without Psychotic Features     Relevant Medical History: HTN    Scheduled Medications:  EScitalopram oxalate, 10 mg, Daily    Continuous Infusions:   PRN Medications:   Current Facility-Administered Medications:     acetaminophen, 650 mg, Oral, Q6H PRN    aluminum-magnesium hydroxide-simethicone, 30 mL, Oral, Q6H PRN    cloNIDine, 0.1 mg, Oral, Q8H PRN    cloNIDine,  0.2 mg, Oral, Q8H PRN    haloperidoL, 10 mg, Oral, Q4H PRN **AND** diphenhydrAMINE, 50 mg, Oral, Q4H PRN **AND** LORazepam, 2 mg, Oral, Q4H PRN **AND** haloperidol lactate, 10 mg, Intramuscular, Q4H PRN **AND** diphenhydrAMINE, 50 mg, Intramuscular, Q4H PRN **AND** lorazepam, 2 mg, Intramuscular, Q4H PRN    hydrOXYzine HCL, 50 mg, Oral, Q4H PRN    magnesium hydroxide 400 mg/5 ml, 30 mL, Oral, Daily PRN    ondansetron, 4 mg, Oral, Q6H PRN    traZODone, 100 mg, Oral, Nightly PRN    Calorie Containing IV Medications: no significant kcals from medications at this time    Recent Labs   Lab 05/02/24  0803 05/03/24  0705     --    K 3.3*  --    CALCIUM 9.7  --    CHLORIDE 107  --    CO2 25  --    BUN 23.1  --    CREATININE 0.86  --    EGFRNORACEVR >60  --    GLUCOSE 89  --    BILITOT 0.6  --    ALKPHOS 68  --    ALT 19  --    AST 19  --    ALBUMIN 4.2  --    TRIG  --  68   HGBA1C  --  4.8   WBC 5.82  --    HGB 14.2  --    HCT 42.2  --      Nutrition Orders:  Diet Adult Regular Double Portions      Appetite/Oral Intake: good/% of meals  Factors Affecting Nutritional Intake:  abdominal discomfort with medication  Social Needs Impacting Access to Food: none identified  Food/Restoration/Cultural Preferences: none reported  Food Allergies: no known food allergies     Wound(s):  None noted    Comments  5/3: Pt reports good po intake, consuming >75% of most meals. Pt denies any N/V/D/C; LBM 5/3 -- pt does report abd discomfort with meds. Pt reports decreased appetite >1 month pta d/t substance/etoh abuse. Pt reports good appetite ~1wk out of the month. Does not report difficulty with access to food. Pt reports wt loss; unable to provide timeframe. Pt states UBW ~175# -- equates to ~11% wt loss (significance unknown at this time); standing wt of 163# obtained during visit. Noted no wt hx per EMR. Unable to perform NFPE at this time; will attempt on f/up if able. Pt ok to add Boost Plus TID -- nsg  "aware.      Anthropometrics    Height: 5' 10" (177.8 cm), Height Method: Stated  Last Weight: 74 kg (163 lb 2.3 oz) (RDN obtained) (24 1210), Weight Method: Standard Scale  BMI (Calculated): 23.4  BMI Classification: normal (BMI 18.5-24.9)        Ideal Body Weight (IBW), Male: 166 lb     % Ideal Body Weight, Male (lb): 98.28 %                 Usual Body Weight (UBW), k.5 kg  % Usual Body Weight: 93.28  % Weight Change From Usual Weight: -6.92 %  Usual Weight Provided By: patient    Wt Readings from Last 5 Encounters:   24 74 kg (163 lb 2.3 oz)   24 79.4 kg (175 lb)     Weight Change(s) Since Admission:   Wt Readings from Last 1 Encounters:   24 1210 74 kg (163 lb 2.3 oz)   24 1102 70.9 kg (156 lb 3.2 oz)   Admit Weight: 70.9 kg (156 lb 3.2 oz) (24 1102), Weight Method: Standard Scale    Estimated Needs    Weight Used For Calorie Calculations: 74 kg (163 lb 2.3 oz)  Energy Calorie Requirements (kcal): 1850 kcal (25 kcal/kg)  Energy Need Method: Kcal/kg  Weight Used For Protein Calculations: 74 kg (163 lb 2.3 oz)  Protein Requirements: 74-89g (1.0-1.2 g/kg)  Fluid Requirements (mL): 1850mL or per MD (1mL/kcal)        Enteral Nutrition     Patient not receiving enteral nutrition at this time.    Parenteral Nutrition     Patient not receiving parenteral nutrition support at this time.    Evaluation of Received Nutrient Intake    Calories: meeting estimated needs  Protein: meeting estimated needs    Patient Education     Not applicable.    Nutrition Diagnosis     PES: Inadequate energy intake related to  social/environmental circumstances as evidenced by intake </=75% for >1 month; hx substance/etoh abuse. (new)     Nutrition Interventions     Intervention(s): general/healthful diet, commercial beverage, multivitamin/mineral supplement therapy, and collaboration with other providers    Goal: Meet greater than 80% of nutritional needs by follow-up. (new)  Goal: Maintain weight " throughout hospitalization. (new)    Nutrition Goals & Monitoring     Dietitian will monitor: food and beverage intake, weight, and gastrointestinal profile  Discharge planning: resume home regimen  Nutrition Risk/Follow-Up: low (follow-up in 5-7 days)   Please consult if re-assessment needed sooner.

## 2024-05-03 NOTE — NURSING
"PRN Administration Note:    Behavior:    Patient (Oli Sanderson is a 63 y.o. male, : 1960, MRN: [id number])     Allergies: Patient has no known allergies.    Oli's  height is 7' 5" (2.261 m) and weight is 70.9 kg (156 lb 3.2 oz). His temperature is 98.6 °F (37 °C). His blood pressure is 131/95 (abnormal) and his pulse is 87. His respiration is 16 and oxygen saturation is 96%.     Reason for PRN Administration: anxiety.    Intervention:    Administered Atarax 50 mg PO PRN per physician order to Oli       Response:    Oli tolerated administration well.      Plan:     Continue to monitor per MD/PA/APRN orders; and reevaluate medication effectiveness within 30 minutes.    "

## 2024-05-03 NOTE — PROGRESS NOTES
05/03/24 1743   [facility] Group Therapy   Group Name Education   Specific Interventions Guided Imagery/Relaxation   Participation Level None   Participation Quality Refused

## 2024-05-03 NOTE — NURSING
Blood pressure rechecked after Clonidine 0,2 mg is given 140/90 mm hg. Will continue to monitor patient.

## 2024-05-03 NOTE — PLAN OF CARE
Problem: Adult Behavioral Health Plan of Care  Goal: Plan of Care Review  Outcome: Progressing  Flowsheets (Taken 5/3/2024 0821)  Patient Agreement with Plan of Care: agrees  Plan of Care Reviewed With: patient  Goal: Patient-Specific Goal (Individualization)  Outcome: Progressing  Flowsheets (Taken 5/3/2024 0821)  Patient Personal Strengths: independent living skills  Patient Vulnerabilities: substance abuse/addiction  Goal: Adheres to Safety Considerations for Self and Others  Outcome: Progressing  Flowsheets (Taken 5/3/2024 0821)  Adheres to Safety Considerations for Self and Others: making progress toward outcome  Intervention: Develop and Maintain Individualized Safety Plan  Flowsheets (Taken 5/3/2024 0821)  Safety Measures: safety rounds completed  Goal: Absence of New-Onset Illness or Injury  Outcome: Progressing  Intervention: Identify and Manage Fall Risk  Flowsheets (Taken 5/3/2024 0821)  Safety Measures: safety rounds completed  Intervention: Prevent VTE (Venous Thromboembolism)  Flowsheets (Taken 5/3/2024 0821)  VTE Prevention/Management: fluids promoted  Intervention: Prevent Infection  Flowsheets (Taken 5/3/2024 0821)  Infection Prevention: hand hygiene promoted  Goal: Optimized Coping Skills in Response to Life Stressors  Outcome: Progressing  Flowsheets (Taken 5/3/2024 0821)  Optimized Coping Skills in Response to Life Stressors: making progress toward outcome  Intervention: Promote Effective Coping Strategies  Flowsheets (Taken 5/3/2024 0821)  Supportive Measures: active listening utilized  Goal: Develops/Participates in Therapeutic [place] to Support Successful Transition  Outcome: Progressing  Flowsheets (Taken 5/3/2024 0821)  Develops/Participates in Therapeutic [place] to Support Successful Transition: making progress toward outcome  Intervention: Foster Therapeutic [place]  Flowsheets (Taken 5/3/2024 0821)  Trust Relationship/Rapport: care explained  Goal: Rounds/Family  Conference  Outcome: Progressing  Flowsheets (Taken 5/3/2024 0821)  Participants:   nursing   milieu/psych techs     Problem: Depressive Signs/Symptoms  Goal: Optimized Energy Level (Depressive Signs/Symptoms)  Outcome: Progressing  Flowsheets (Taken 5/3/2024 0821)  Mutually Determined Action Steps (Optimized Energy Level): dresses/ready for morning activity  Intervention: Optimize Energy Level  Flowsheets (Taken 5/3/2024 0821)  Activity (Behavioral Health): up ad garth  Patient Performed Hygiene: teeth brushed  Diversional Activity: television  Goal: Optimized Cognitive Function (Depressive Signs/Symptoms)  Outcome: Progressing  Flowsheets (Taken 5/3/2024 0821)  Mutually Determined Action Steps (Optimized Cognitive Function): remains focused during activity  Goal: Increased Participation and Engagement (Depressive Signs/Symptoms)  Outcome: Progressing  Flowsheets (Taken 5/3/2024 0821)  Mutually Determined Action Steps (Increased Participation and Engagement): participates in one or more activity  Intervention: Facilitate Participation and Engagement  Flowsheets (Taken 5/3/2024 0821)  Supportive Measures: active listening utilized  Diversional Activity: television  Goal: Enhanced Self-Esteem and Confidence (Depressive Signs/Symptoms)  Outcome: Progressing  Flowsheets (Taken 5/3/2024 0821)  Mutually Determined Action Steps (Enhanced Self-Esteem and Confidence): identifies judgmental thoughts  Intervention: Promote Confidence and Self-Esteem  Flowsheets (Taken 5/3/2024 0821)  Supportive Measures: active listening utilized  Goal: Improved Mood Symptoms (Depressive Signs/Symptoms)  Outcome: Progressing  Flowsheets (Taken 5/3/2024 0821)  Mutually Determined Action Steps (Improved Mood Symptoms): identifies thought distortion  Intervention: Promote Mood Improvement  Flowsheets (Taken 5/3/2024 0821)  Supportive Measures: active listening utilized  Diversional Activity: television  Goal: Optimized Nutrition Intake (Depressive  Signs/Symptoms)  Outcome: Progressing  Flowsheets (Taken 5/3/2024 0821)  Mutually Determined Action Steps (Optimized Nutrition Intake): eats at meal time  Intervention: Promote Optimal Nutrition Intake  Flowsheets (Taken 5/3/2024 0821)  Nutrition Interventions: food preferences provided  Bowel Elimination Promotion: adequate fluid intake promoted  Goal: Improved Psychomotor Symptoms (Depressive Signs/Symptoms)  Outcome: Progressing  Flowsheets (Taken 5/3/2024 0821)  Mutually Determined Action Steps (Improved Psychomotor Symptoms): adheres to medication regimen  Intervention: Manage Psychomotor Movement  Flowsheets (Taken 5/3/2024 0821)  Activity (Behavioral Health): up ad garth  Patient Performed Hygiene: teeth brushed  Diversional Activity: television  Goal: Improved Sleep (Depressive Signs/Symptoms)  Outcome: Progressing  Flowsheets (Taken 5/3/2024 0821)  Mutually Determined Action Steps (Improved Sleep): sleeps 4-6 hours at night  Intervention: Promote Healthy Sleep Hygiene  Flowsheets (Taken 5/3/2024 0821)  Sleep Hygiene Promotion: regular sleep pattern promoted  Goal: Enhanced Social, Occupational or Functional Skills (Depressive Signs/Symptoms)  Outcome: Progressing  Flowsheets (Taken 5/3/2024 0821)  Mutually Determined Action Steps (Enhanced Social, Occupational or Functional Skills): identifies personal strengths  Intervention: Promote Social, Occupational and Functional Ability  Flowsheets (Taken 5/3/2024 0821)  Social Functional Ability Promotion: social interaction promoted     Problem: Excessive Substance Use  Goal: Optimized Energy Level (Excessive Substance Use)  Outcome: Progressing  Flowsheets (Taken 5/3/2024 0821)  Mutually Determined Action Steps (Optimized Energy Level): dresses/ready for morning activity  Intervention: Optimize Energy Level  Flowsheets (Taken 5/3/2024 0821)  Activity (Behavioral Health): up ad garth  Patient Performed Hygiene: teeth brushed  Diversional Activity: television  Goal:  Improved Behavioral Control (Excessive Substance Use)  Outcome: Progressing  Flowsheets (Taken 5/3/2024 0821)  Mutually Determined Action Steps (Improved Behavioral Control): identifies major stressors  Intervention: Promote Behavior and Impulse Control  Flowsheets (Taken 5/3/2024 0821)  Behavior Management: behavioral plan reviewed  Goal: Increased Participation and Engagement (Excessive Substance Use)  Outcome: Progressing  Flowsheets (Taken 5/3/2024 0821)  Mutually Determined Action Steps (Increased Participation and Engagement): identifies support resources  Intervention: Facilitate Participation and Engagement  Flowsheets (Taken 5/3/2024 0821)  Supportive Measures: active listening utilized  Diversional Activity: television  Goal: Improved Physiologic Symptoms (Excessive Substance Use)  Outcome: Progressing  Flowsheets (Taken 5/3/2024 0821)  Mutually Determined Action Steps (Improved Physiologic Symptoms): discusses use pattern  Intervention: Optimize Physiologic Function  Flowsheets (Taken 5/3/2024 0821)  Oral Nutrition Promotion: social interaction promoted  Nutrition Interventions: food preferences provided  Goal: Enhanced Social, Occupational or Functional Skills (Excessive Substance Use)  Outcome: Progressing  Flowsheets (Taken 5/3/2024 0821)  Mutually Determined Action Steps (Enhanced Social, Occupational or Functional Skills): identifies personal strengths  Intervention: Promote Social, Occupational and Functional Ability  Flowsheets (Taken 5/3/2024 0821)  Trust Relationship/Rapport: care explained  Social Functional Ability Promotion: social interaction promoted     Problem: Suicide Risk  Goal: Absence of Self-Harm  Outcome: Progressing  Intervention: Assess Risk to Self and Maintain Safety  Flowsheets (Taken 5/3/2024 0821)  Behavior Management: behavioral plan reviewed  Enhanced Safety Measures: monitored by video  Self-Harm Prevention: environmental self-harm risks assessed  Intervention: Promote  Psychosocial Wellbeing  Flowsheets (Taken 5/3/2024 0821)  Sleep/Rest Enhancement: regular sleep/rest pattern promoted  Supportive Measures: active listening utilized  Family/Support System Care: self-care encouraged  Intervention: Establish Safety Plan and Continuity of Care  Flowsheets (Taken 5/3/2024 0821)  Safe Transition Promotion: protective factors promoted    He is AAO X 4. He is irritable and easily agitated. Labile moods noted. States he is experiencing anxiety this morning. Denies depression, SI, HI, hallucinations, and delusions this AM. He reports insomnia during the night. Night Nurse reported that patient slept 9 hours last night. Fair eye contact noted. Speech normal tone and speed. Interacts with selected patients and staff. Continue plan of care and provide a safe and therapeutic environment. Continue to monitor every fifteen minutes for safety.

## 2024-05-03 NOTE — PROGRESS NOTES
05/03/24 1743   [facility] Therapy   Group Name Education   Specific Interventions Guided Imagery/Relaxation   Participation Level None   Participation Quality Refused

## 2024-05-03 NOTE — NURSING
Patient stated relief of stomach discomfort , medication Aluminum Magnesium hydroxide Simethicone 200-200---20 mg /5 ml suspension 30 ml effective.

## 2024-05-03 NOTE — NURSING
"PRN Medication Follow-up Note:    Behavior:    Patient (Oli Sanderson is a 63 y.o. male, : 1960, MRN: [id number])     Allergies: Patient has no known allergies.    Mellisas  height is 7' 5" (2.261 m) and weight is 70.9 kg (156 lb 3.2 oz). His temperature is 98.6 °F (37 °C). His blood pressure is 131/95 (abnormal) and his pulse is 87. His respiration is 16 and oxygen saturation is 96%.     Administered Atarax 50 mg PO PRN per physician order to Oli       Intervention:    Intervention to Oli's response: decrease in anxiety.      Response:    Oli's response: decrease in anxiety.      Plan:     Continue to monitor per MD/PA/APRN orders; and reevaluate medication effectiveness within 30 minutes.    "

## 2024-05-03 NOTE — H&P
5/3/2024  Oli Sanderson   1960   [id number]            Psychiatry Inpatient Admission Note    Date of Admission: 5/2/2024 11:02 AM    Current Legal Status: Physician's Emergency Certificate    Chief Complaint: Depressed    SUBJECTIVE:   History of Present Illness:   Oli Sanderson is a 63 y.o. male placed under a PEC at [facility] with suicidal ideation with an attempt to OD on sleeping pills.     States that he was smoking crack for a couple days fairly heavy. He has been using for a couple of years he also states that he drinks approximately a 6 pack per day. Patient was inpatient here back in 2021 and discharged home with Lexapro. He stayed on this for a couple of months but stopped when he felt he no longer needed it. He states that he lost his oldest son since then and this led to him starting to drink again which led to him using cocaine. He states that over time his mood became lower and lower. He endorsed that the Lexapro did help his mood when he was taking it so we will restart this. He denies any SI today but does endorse continued depression. He is amenable to going inpatient for rehab so I will follow up with staff for placement. Will admit for medication management and safety monitoring.         Past Psychiatric History:   Previous Psychiatric Hospitalizations: Once   Previous Medication Trials: Lexapro  Previous Suicide Attempts: Once   Outpatient psychiatrist: Denies    Past Medical/Surgical History:   Past Medical History:   Diagnosis Date    Hypertension      No past surgical history on file.    Family Psychiatric History:   Denies     Allergies:   Review of patient's allergies indicates:  No Known Allergies    Substance Abuse History:   Tobacco: 1 PPW  Alcohol: 6 beer per day  Illicit Substances: Cocaine  Treatment: Denies      Current Medications:   Home Psychiatric Meds: Denies    Scheduled Meds:   Current Facility-Administered Medications   Medication Dose Route Frequency      PRN Meds:   Current  "Facility-Administered Medications:     acetaminophen, 650 mg, Oral, Q6H PRN    aluminum-magnesium hydroxide-simethicone, 30 mL, Oral, Q6H PRN    cloNIDine, 0.1 mg, Oral, Q8H PRN    cloNIDine, 0.2 mg, Oral, Q8H PRN    haloperidoL, 10 mg, Oral, Q4H PRN **AND** diphenhydrAMINE, 50 mg, Oral, Q4H PRN **AND** LORazepam, 2 mg, Oral, Q4H PRN **AND** haloperidol lactate, 10 mg, Intramuscular, Q4H PRN **AND** diphenhydrAMINE, 50 mg, Intramuscular, Q4H PRN **AND** lorazepam, 2 mg, Intramuscular, Q4H PRN    hydrOXYzine HCL, 50 mg, Oral, Q4H PRN    magnesium hydroxide 400 mg/5 ml, 30 mL, Oral, Daily PRN    ondansetron, 4 mg, Oral, Q6H PRN    traZODone, 100 mg, Oral, Nightly PRN   Psychotherapeutics (From admission, onward)      Start     Stop Route Frequency Ordered    24 1131  traZODone tablet 100 mg         -- Oral Nightly PRN 24 1109    24 1129  haloperidoL tablet 10 mg  (Med - Acute  Behavioral Management)        Placed in "And" Linked Group    -- Oral Every 4 hours PRN 24 1109    24 1129  LORazepam tablet 2 mg  (Med - Acute  Behavioral Management)        Placed in "And" Linked Group    -- Oral Every 4 hours PRN 24 1109    24 1129  haloperidol lactate injection 10 mg  (Med - Acute  Behavioral Management)        Placed in "And" Linked Group    -- IM Every 4 hours PRN 24 1109    24 1129  LORazepam injection 2 mg  (Med - Acute  Behavioral Management)        Placed in "And" Linked Group    -- IM Every 4 hours PRN 24 1109              Social History:  Housing Status: Lives alone  Relationship Status/Sexual Orientation: Single   Children: 7 total 1   Education: 9th   Employment Status/Info: Cuts yards    history: Denies  History of physical/sexual abuse: Denies   Access to gun: Denies       Legal History:   Past Charges/Incarcerations: Child support   Pending charges: Denies      OBJECTIVE:   Medical Review Of Systems:  HTN    Vitals   Vitals:    24 " 0701   BP: (!) 131/95   Pulse: 87   Resp: 16   Temp: 98.6 °F (37 °C)        Labs/Imaging/Studies:   Recent Results (from the past 48 hour(s))   EKG 12-lead (Chest Pain) Age >30    Collection Time: 05/02/24  7:42 AM   Result Value Ref Range    QRS Duration 106 ms    [facility] QTC Calculation 488 ms   COVID-19 Rapid Screening    Collection Time: 05/02/24  7:59 AM   Result Value Ref Range    SARS COV-2 MOLECULAR Negative Negative   Ethanol    Collection Time: 05/02/24  8:03 AM   Result Value Ref Range    Ethanol Level <10.0 <=10.0 mg/dL   Salicylate Level    Collection Time: 05/02/24  8:03 AM   Result Value Ref Range    Salicylate Level <5.0 (L) 15.0 - 30.0 mg/dL   Acetaminophen Level    Collection Time: 05/02/24  8:03 AM   Result Value Ref Range    Acetaminophen Level <17.4 10.0 - 30.0 ug/ml   Comprehensive Metabolic Panel    Collection Time: 05/02/24  8:03 AM   Result Value Ref Range    Sodium Level 141 136 - 145 mmol/L    Potassium Level 3.3 (L) 3.5 - 5.1 mmol/L    Chloride 107 98 - 107 mmol/L    Carbon Dioxide 25 23 - 31 mmol/L    Glucose Level 89 82 - 115 mg/dL    Blood Urea Nitrogen 23.1 8.4 - 25.7 mg/dL    Creatinine 0.86 0.73 - 1.18 mg/dL    Calcium Level Total 9.7 8.8 - 10.0 mg/dL    Protein Total 7.4 5.8 - 7.6 gm/dL    Albumin Level 4.2 3.4 - 4.8 g/dL    Globulin 3.2 2.4 - 3.5 gm/dL    Albumin/Globulin Ratio 1.3 1.1 - 2.0 ratio    Bilirubin Total 0.6 <=1.5 mg/dL    Alkaline Phosphatase 68 40 - 150 unit/L    Alanine Aminotransferase 19 0 - 55 unit/L    Aspartate Aminotransferase 19 5 - 34 unit/L    eGFR >60 mls/min/1.73/m2   CBC with Differential    Collection Time: 05/02/24  8:03 AM   Result Value Ref Range    WBC 5.82 4.50 - 11.50 x10(3)/mcL    RBC 4.48 (L) 4.70 - 6.10 x10(6)/mcL    Hgb 14.2 14.0 - 18.0 g/dL    Hct 42.2 42.0 - 52.0 %    MCV 94.2 (H) 80.0 - 94.0 fL    MCH 31.7 (H) 27.0 - 31.0 pg    MCHC 33.6 33.0 - 36.0 g/dL    RDW 12.8 11.5 - 17.0 %    Platelet 306 130 - 400 x10(3)/mcL    MPV 9.4 7.4 - 10.4 fL     Neut % 64.0 %    Lymph % 25.4 %    Mono % 9.1 %    Eos % 1.0 %    Basophil % 0.3 %    Lymph # 1.48 0.6 - 4.6 x10(3)/mcL    Neut # 3.72 2.1 - 9.2 x10(3)/mcL    Mono # 0.53 0.1 - 1.3 x10(3)/mcL    Eos # 0.06 0 - 0.9 x10(3)/mcL    Baso # 0.02 <=0.2 x10(3)/mcL    IG# 0.01 0 - 0.04 x10(3)/mcL    IG% 0.2 %    NRBC% 0.0 %   Light Blue Top Hold    Collection Time: 05/02/24  8:06 AM   Result Value Ref Range    Extra Tube Hold for add-ons.    Gold Top Hold    Collection Time: 05/02/24  8:06 AM   Result Value Ref Range    Extra Tube Hold for add-ons.    Pink Top Hold    Collection Time: 05/02/24  8:06 AM   Result Value Ref Range    Extra Tube Hold for add-ons.    Light Green Top Hold    Collection Time: 05/02/24  8:08 AM   Result Value Ref Range    Extra Tube Hold for add-ons.    TSH    Collection Time: 05/02/24  8:08 AM   Result Value Ref Range    TSH 0.948 0.350 - 4.940 uIU/mL   Drug Screen, Urine    Collection Time: 05/02/24  8:27 AM   Result Value Ref Range    Amphetamines, Urine Negative Negative    Barbituates, Urine Negative Negative    Benzodiazepine, Urine Negative Negative    Cannabinoids, Urine Negative Negative    Cocaine, Urine Positive (A) Negative    Fentanyl, Urine Negative Negative    MDMA, Urine Negative Negative    Opiates, Urine Negative Negative    Phencyclidine, Urine Negative Negative    pH, Urine 6.0 3.0 - 11.0    Specific Gravity, Urine Auto 1.034 1.001 - 1.035   Urinalysis, Reflex to Urine Culture    Collection Time: 05/02/24  8:27 AM    Specimen: Urine   Result Value Ref Range    Color, UA Yellow Yellow, Light-Yellow, Dark Yellow, Neha, Straw    Appearance, UA Clear Clear    Specific Gravity, UA 1.034 (H) 1.005 - 1.030    pH, UA 6.0 5.0 - 8.5    Protein, UA Trace (A) Negative    Glucose, UA Normal Negative, Normal    Ketones, UA Trace (A) Negative    Blood, UA 2+ (A) Negative    Bilirubin, UA Negative Negative    Urobilinogen, UA 1+ (A) 0.2, 1.0, Normal    Nitrites, UA Negative Negative    Leukocyte  "Esterase, UA Negative Negative    WBC, UA 0-5 None Seen, 0-2, 3-5, 0-5 /HPF    Bacteria, UA Trace (A) None Seen /HPF    Squamous Epithelial Cells, UA None Seen None Seen /HPF    Mucous, UA Many (A) None Seen /LPF    Hyaline Casts, UA 0-2 (A) None Seen /lpf    RBC, UA 21-50 (A) None Seen, 0-2, 3-5, 0-5 /HPF   Hemoglobin A1C    Collection Time: 05/03/24  7:05 AM   Result Value Ref Range    Hemoglobin A1c 4.8 <=7.0 %    Estimated Average Glucose 91.1 mg/dL   Glucose, Fasting    Collection Time: 05/03/24  7:05 AM   Result Value Ref Range    Glucose Fasting 84 70 - 100 mg/dL   Lipid Panel    Collection Time: 05/03/24  7:05 AM   Result Value Ref Range    Cholesterol Total 161 <=200 mg/dL    HDL Cholesterol 61 (H) 35 - 60 mg/dL    Triglyceride 68 34 - 140 mg/dL    Cholesterol/HDL Ratio 3 0 - 5    Very Low Density Lipoprotein 14     LDL Cholesterol 86.00 50.00 - 140.00 mg/dL      No results found for: "PHENYTOIN", "PHENOBARB", "VALPROATE", "CBMZ"        Psychiatric Mental Status Exam:  General Appearance: appears stated age, well developed and nourished, adequately groomed and appropriately dressed, in no acute distress  Arousal: alert with clear sensorium  Behavior: normal; cooperative; reasonably friendly, pleasant, and polite; appropriate eye-contact; under good behavioral control  Movements and Motor Activity: no abnormal involuntary movements noted; no tics, no tremors, no akathisia, no dystonia, no evidence of tardive dyskinesia; no psychomotor agitation or retardation  Orientation: intact; oriented fully to person, place, time and situation  Speech: intact; normal rate, rhythm, volume, tone and pitch; conversational, spontaneous, and coherent  Mood: Depressed  Affect: constricted  Thought Process: intact, linear, goal-directed, organized, logical  Associations: intact, no loosening of associations  Thought Content and Perceptions: recent suicidal ideation, no homicidal ideation, no auditory or visual hallucinations, " no paranoid ideation, no ideas of reference, no evidence of delusions or psychosis  Recent and Remote Memory: grossly intact, able to recall relevant and salient information from the recent and remote past; per interview/observation with patient  Attention and Concentration: intact; per interview/observation with patient  Fund of Knowledge: intact; based on history, vocabulary, fund of knowledge, syntax, grammar, and content  Insight: intact; based on understanding of severity of illness and HPI  Judgment: intact; based on patient's behavior and HPI      Patient Strengths:  Access to care, Able to verbalize needs, Stable physical health, Motivation for treatment, and Capable of independent living      Patient Liabilities:  Medication non-compliance, Substance use, and Depression      Discharge Criteria:  Improved mood, Improved thought process, Medication compliance, Improved coping skills, and Improved health maintenance      Reason for Admission:  The patient poses a significant and immediate danger to self. and To stabilize disabling psychiatric/psychological symptoms of sudden onset.    ASSESSMENT/PLAN:   Diagnoses:  SUBSTANCE-RELATED DISORDERS; Alcohol-Related Disorders; Alcohol Abuse Without Physiological Dependence  and MOOD DISORDERS; Major Depressive Disorder, Recurrent: Severe Without Psychotic Features (F33.2)         Past Medical History:   Diagnosis Date    Hypertension         Problem lists and Management Plans:  -Admit to [facility]    Mood  -Lexapro 10 mg    -Will attempt to obtain outside psychiatric records if available  - to assist with aftercare planning and collateral  -Continue inpatient treatment as evidenced by suicidal ideation      Estimated length of stay: 5-7    Estimated Disposition: Substance treatment program    Estimated Follow-up: Outpatient medication management and Substance treatment program      On this date, I have reviewed the medical history and Nursing Assessment, as  well as records from referral source.  I have evaluated the mental status of the above named person and concur with the findings of all assessments.  I have provided medical direction for the development of the Treatment Plan.    I conclude that this patient meets admission criteria for inpatient treatment.  I certify that this patient poses a danger to self or others, or would otherwise be considered gravely disabled based on this assessment and/or provided collateral information.     I have provided medical direction for the development of the Treatment plan.  These services will be provided while this patient is under my care and will be based on an individualized plan of care.  The patient can demonstrate a reasonable expectation of improvement in his/her disorder as a result of the active treatment being provided.      Thaddeus Hernandez [facility]

## 2024-05-03 NOTE — NURSING
"PRN Medication Follow-up Note:    Behavior:    Patient (Oli Sanderson is a 63 y.o. male, : 1960, MRN: [id number])     Allergies: Patient has no known allergies.    Mellisas  height is 7' 5" (2.261 m) and weight is 70.9 kg (156 lb 3.2 oz). His temperature is 98.6 °F (37 °C). His blood pressure is 131/95 (abnormal) and his pulse is 87. His respiration is 16 and oxygen saturation is 96%.     Administered Tylenol 650 mg PO PRN and Maalox 30 mg PO PRN per physician order to Oli       Intervention:    Intervention to Oli's response: relief of headache and heartburn.      Response:    Oli's response: relief of headache and heartburn.      Plan:     Continue to monitor per MD/PA/APRN orders; and reevaluate medication effectiveness within 30 minutes.    "

## 2024-05-03 NOTE — NURSING
Patients blood pressure reading for 1600 205/122 per electronic vital sign machine, rechecked manually 180/110 given Clonidine 0.2 mg oral PRN  Patient denies headache ,light headedness and weakness.Given Aluminum -Magnesium Hydroxide simethicone 200-2---20 mg/5 ml suspension 30 ml for stoch upset,acid like feeling per verbalization. Will continue to monitor patient.

## 2024-05-03 NOTE — PLAN OF CARE
Nursing assessment completed as charted. Patient verbalized anxiety and sleep disturbance. Patient denies depression, SI/HI, and AVH. Patient was given PRN trazodone for sleep and atarax for anxiety. Will continue to monitor   Problem: Depressive Signs/Symptoms  Goal: Improved Sleep (Depressive Signs/Symptoms)  Outcome: Not Progressing  Flowsheets (Taken 5/2/2024 2152)  Mutually Determined Action Steps (Improved Sleep): identifies disturbance factors  Intervention: Promote Healthy Sleep Hygiene  Flowsheets (Taken 5/2/2024 2152)  Sleep Hygiene Promotion:   awakenings minimized   room lighting adjusted  Goal: Enhanced Social, Occupational or Functional Skills (Depressive Signs/Symptoms)  Outcome: Not Progressing  Intervention: Promote Social, Occupational and Functional Ability  Flowsheets (Taken 5/2/2024 2152)  Social Functional Ability Promotion:   social interaction promoted   self-expression encouraged

## 2024-05-03 NOTE — PROGRESS NOTES
05/03/24 1000   [facility] Group Therapy   Group Name Therapeutic Recreation   Specific Interventions Skilled Activity Mild Exercises   Participation Level Active;Supportive;Appropriate;Attentive;Sharing   Participation Quality Cooperative;Social   Insight/Motivation Improved;Applies New Skills   Affect/Mood Display Appropriate   Cognition Oriented;Alert   Psychomotor WNL

## 2024-05-04 PROCEDURE — 25000003 PHARM REV CODE 250: Performed by: PSYCHIATRY & NEUROLOGY

## 2024-05-04 PROCEDURE — 25000003 PHARM REV CODE 250

## 2024-05-04 PROCEDURE — 11400000 HC PSYCH PRIVATE ROOM

## 2024-05-04 PROCEDURE — 25000003 PHARM REV CODE 250: Performed by: PEDIATRICS

## 2024-05-04 RX ORDER — LISINOPRIL 10 MG/1
10 TABLET ORAL DAILY
Status: DISCONTINUED | OUTPATIENT
Start: 2024-05-04 | End: 2024-05-09 | Stop reason: HOSPADM

## 2024-05-04 RX ADMIN — ACETAMINOPHEN 650 MG: 325 TABLET, FILM COATED ORAL at 08:05

## 2024-05-04 RX ADMIN — TRAZODONE HYDROCHLORIDE 100 MG: 100 TABLET ORAL at 08:05

## 2024-05-04 RX ADMIN — LISINOPRIL 10 MG: 10 TABLET ORAL at 01:05

## 2024-05-04 RX ADMIN — CLONIDINE HYDROCHLORIDE 0.1 MG: 0.1 TABLET ORAL at 08:05

## 2024-05-04 RX ADMIN — ESCITALOPRAM OXALATE 10 MG: 10 TABLET ORAL at 08:05

## 2024-05-04 RX ADMIN — CLONIDINE HYDROCHLORIDE 0.1 MG: 0.1 TABLET ORAL at 07:05

## 2024-05-04 RX ADMIN — HYDROXYZINE HYDROCHLORIDE 50 MG: 50 TABLET, FILM COATED ORAL at 08:05

## 2024-05-04 NOTE — PLAN OF CARE
Problem: Adult Behavioral Health Plan of Care  Goal: Plan of Care Review  Outcome: Progressing  Flowsheets (Taken 5/4/2024 0814)  Patient Agreement with Plan of Care: agrees  Plan of Care Reviewed With: patient  Goal: Patient-Specific Goal (Individualization)  Outcome: Progressing  Flowsheets (Taken 5/4/2024 0814)  Patient Personal Strengths: independent living skills  Patient Vulnerabilities: substance abuse/addiction  Goal: Adheres to Safety Considerations for Self and Others  Outcome: Progressing  Flowsheets (Taken 5/4/2024 0814)  Adheres to Safety Considerations for Self and Others: making progress toward outcome  Intervention: Develop and Maintain Individualized Safety Plan  Flowsheets (Taken 5/4/2024 0814)  Safety Measures: safety rounds completed  Goal: Absence of New-Onset Illness or Injury  Outcome: Progressing  Intervention: Identify and Manage Fall Risk  Flowsheets (Taken 5/4/2024 0814)  Safety Measures: safety rounds completed  Intervention: Prevent VTE (Venous Thromboembolism)  Flowsheets (Taken 5/4/2024 0814)  VTE Prevention/Management: fluids promoted  Intervention: Prevent Infection  Flowsheets (Taken 5/4/2024 0814)  Infection Prevention: hand hygiene promoted  Goal: Optimized Coping Skills in Response to Life Stressors  Outcome: Progressing  Flowsheets (Taken 5/4/2024 0814)  Optimized Coping Skills in Response to Life Stressors: making progress toward outcome  Intervention: Promote Effective Coping Strategies  Flowsheets (Taken 5/4/2024 0814)  Supportive Measures: active listening utilized  Goal: Develops/Participates in Therapeutic [place] to Support Successful Transition  Outcome: Progressing  Flowsheets (Taken 5/4/2024 0814)  Develops/Participates in Therapeutic [place] to Support Successful Transition: making progress toward outcome  Intervention: Foster Therapeutic [place]  Flowsheets (Taken 5/4/2024 0814)  Trust Relationship/Rapport: care explained  Goal: Rounds/Family  Conference  Outcome: Progressing  Flowsheets (Taken 5/4/2024 0814)  Participants:   milieu/psych techs   nursing     Problem: Depressive Signs/Symptoms  Goal: Optimized Energy Level (Depressive Signs/Symptoms)  Outcome: Progressing  Flowsheets (Taken 5/4/2024 0814)  Mutually Determined Action Steps (Optimized Energy Level): dresses/ready for morning activity  Intervention: Optimize Energy Level  Flowsheets (Taken 5/4/2024 0814)  Activity (Behavioral Health): up ad garth  Patient Performed Hygiene: teeth brushed  Diversional Activity: television  Goal: Optimized Cognitive Function (Depressive Signs/Symptoms)  Outcome: Progressing  Flowsheets (Taken 5/4/2024 0814)  Mutually Determined Action Steps (Optimized Cognitive Function): remains focused during activity  Goal: Increased Participation and Engagement (Depressive Signs/Symptoms)  Outcome: Progressing  Flowsheets (Taken 5/4/2024 0814)  Mutually Determined Action Steps (Increased Participation and Engagement): participates in one or more activity  Intervention: Facilitate Participation and Engagement  Flowsheets (Taken 5/4/2024 0814)  Supportive Measures: active listening utilized  Diversional Activity: television  Goal: Enhanced Self-Esteem and Confidence (Depressive Signs/Symptoms)  Outcome: Progressing  Flowsheets (Taken 5/4/2024 0814)  Mutually Determined Action Steps (Enhanced Self-Esteem and Confidence): identifies judgmental thoughts  Intervention: Promote Confidence and Self-Esteem  Flowsheets (Taken 5/4/2024 0814)  Supportive Measures: active listening utilized  Goal: Improved Mood Symptoms (Depressive Signs/Symptoms)  Outcome: Progressing  Flowsheets (Taken 5/4/2024 0814)  Mutually Determined Action Steps (Improved Mood Symptoms): identifies thought distortion  Intervention: Promote Mood Improvement  Flowsheets (Taken 5/4/2024 0814)  Supportive Measures: active listening utilized  Diversional Activity: television  Goal: Optimized Nutrition Intake (Depressive  Signs/Symptoms)  Outcome: Progressing  Flowsheets (Taken 5/4/2024 0814)  Mutually Determined Action Steps (Optimized Nutrition Intake): eats at meal time  Intervention: Promote Optimal Nutrition Intake  Flowsheets (Taken 5/4/2024 0814)  Nutrition Interventions: food preferences provided  Bowel Elimination Promotion: adequate fluid intake promoted  Goal: Improved Psychomotor Symptoms (Depressive Signs/Symptoms)  Outcome: Progressing  Flowsheets (Taken 5/4/2024 0814)  Mutually Determined Action Steps (Improved Psychomotor Symptoms): adheres to medication regimen  Intervention: Manage Psychomotor Movement  Flowsheets (Taken 5/4/2024 0814)  Activity (Behavioral Health): up ad garth  Patient Performed Hygiene: teeth brushed  Diversional Activity: television  Goal: Improved Sleep (Depressive Signs/Symptoms)  Outcome: Progressing  Flowsheets (Taken 5/4/2024 0814)  Mutually Determined Action Steps (Improved Sleep): sleeps 4-6 hours at night  Intervention: Promote Healthy Sleep Hygiene  Flowsheets (Taken 5/4/2024 0814)  Sleep Hygiene Promotion: regular sleep pattern promoted  Goal: Enhanced Social, Occupational or Functional Skills (Depressive Signs/Symptoms)  Outcome: Progressing  Flowsheets (Taken 5/4/2024 0814)  Mutually Determined Action Steps (Enhanced Social, Occupational or Functional Skills): identifies personal strengths  Intervention: Promote Social, Occupational and Functional Ability  Flowsheets (Taken 5/4/2024 0814)  Social Functional Ability Promotion: social interaction promoted     Problem: Excessive Substance Use  Goal: Optimized Energy Level (Excessive Substance Use)  Outcome: Progressing  Flowsheets (Taken 5/4/2024 0814)  Mutually Determined Action Steps (Optimized Energy Level): dresses/ready for morning activity  Intervention: Optimize Energy Level  Flowsheets (Taken 5/4/2024 0814)  Activity (Behavioral Health): up ad garth  Patient Performed Hygiene: teeth brushed  Diversional Activity: television  Goal:  Improved Behavioral Control (Excessive Substance Use)  Outcome: Progressing  Flowsheets (Taken 5/4/2024 0814)  Mutually Determined Action Steps (Improved Behavioral Control): identifies major stressors  Intervention: Promote Behavior and Impulse Control  Flowsheets (Taken 5/4/2024 0814)  Behavior Management: behavioral plan reviewed  Goal: Increased Participation and Engagement (Excessive Substance Use)  Outcome: Progressing  Flowsheets (Taken 5/4/2024 0814)  Mutually Determined Action Steps (Increased Participation and Engagement): identifies support resources  Intervention: Facilitate Participation and Engagement  Flowsheets (Taken 5/4/2024 0814)  Supportive Measures: active listening utilized  Diversional Activity: television  Goal: Improved Physiologic Symptoms (Excessive Substance Use)  Outcome: Progressing  Flowsheets (Taken 5/4/2024 0814)  Mutually Determined Action Steps (Improved Physiologic Symptoms): discusses use pattern  Intervention: Optimize Physiologic Function  Flowsheets (Taken 5/4/2024 0814)  Nutrition Interventions: food preferences provided  Goal: Enhanced Social, Occupational or Functional Skills (Excessive Substance Use)  Outcome: Progressing  Flowsheets (Taken 5/4/2024 0814)  Mutually Determined Action Steps (Enhanced Social, Occupational or Functional Skills): identifies personal strengths  Intervention: Promote Social, Occupational and Functional Ability  Flowsheets (Taken 5/4/2024 0814)  Trust Relationship/Rapport: care explained  Social Functional Ability Promotion: social interaction promoted     Problem: Suicide Risk  Goal: Absence of Self-Harm  Outcome: Progressing  Intervention: Assess Risk to Self and Maintain Safety  Flowsheets (Taken 5/4/2024 0814)  Behavior Management: behavioral plan reviewed  Enhanced Safety Measures: monitored by video  Self-Harm Prevention: environmental self-harm risks assessed  Intervention: Promote Psychosocial Wellbeing  Flowsheets (Taken 5/4/2024  0814)  Sleep/Rest Enhancement: regular sleep/rest pattern promoted  Supportive Measures: active listening utilized  Family/Support System Care: self-care encouraged  Intervention: Establish Safety Plan and Continuity of Care  Flowsheets (Taken 5/4/2024 0814)  Safe Transition Promotion: protective factors promoted    He is AAO X 4. Sad affect and depressed mood. Anxious this AM. Given PRN Atarax PO when requested. Clonidine PO PRN given this AM for elevated blood pressure. Denies SI, HI, hallucinations, and delusions at present time. Fair appetite noted. He will receive a Boost at each meal as requested per Dietician. Isolated and withdrawn, but interacts with selected patients and staff. Continue plan of care and provide a safe and therapeutic mileu. Continue to monitor every fifteen minutes for safety.

## 2024-05-04 NOTE — PROGRESS NOTES
05/04/24 1410   [facility] Group Therapy   Group Name Community Reintegration   Specific Interventions Resocialization   Participation Level Attentive   Participation Quality Social;Cooperative   Insight/Motivation Improved   Affect/Mood Display Appropriate   Cognition Alert   Psychomotor WNL

## 2024-05-04 NOTE — PLAN OF CARE
Nursing assessment completed as charted. Patient denies SI/HI, and AVH. Patient endorses anxiety,depression and sleep issues. Patient was given PRN trazodone and PRN atarax. Will continue to monitor   Problem: Depressive Signs/Symptoms  Goal: Improved Psychomotor Symptoms (Depressive Signs/Symptoms)  Outcome: Progressing  Flowsheets (Taken 5/3/2024 2114)  Mutually Determined Action Steps (Improved Psychomotor Symptoms):   adheres to medication regimen   exhibits decrease in agitation  Intervention: Manage Psychomotor Movement  Flowsheets (Taken 5/3/2024 2114)  Activity (Behavioral Health): up ad garth     Problem: Suicide Risk  Goal: Absence of Self-Harm  Outcome: Progressing  Intervention: Promote Psychosocial Wellbeing  Flowsheets (Taken 5/3/2024 2114)  Sleep/Rest Enhancement:   awakenings minimized   relaxation techniques promoted   room darkened  Supportive Measures:   active listening utilized   self-reflection promoted   self-care encouraged   verbalization of feelings encouraged

## 2024-05-04 NOTE — NURSING
"PRN Medication Follow-up Note:    Behavior:    Patient (Oli Sanderson is a 63 y.o. male, : 1960, MRN: [id number])     Allergies: Patient has no known allergies.    [place]  height is 5' 10" (1.778 m) and weight is 74 kg (163 lb 2.3 oz). His temperature is 98.2 °F (36.8 °C). His blood pressure is 165/104 (abnormal) and his pulse is 82. His respiration is 18 and oxygen saturation is 98%.     Administered Tylenol 650 mg PO PRN per physician order to Oli       Intervention:    Intervention to Oli's response: relief of pain.      Response:    Oli's response: minimal relief of pain.      Plan:     Continue to monitor per MD/PA/APRN orders; and reevaluate medication effectiveness within 30 minutes.    "

## 2024-05-04 NOTE — NURSING
"PRN Medication Follow-up Note:    Behavior:    Patient (Oli Sanderson is a 63 y.o. male, : 1960, MRN: [id number])     Allergies: Patient has no known allergies.    Oli's  height is 5' 10" (1.778 m) and weight is 74 kg (163 lb 2.3 oz). His temperature is 98.2 °F (36.8 °C). His blood pressure is 165/104 (abnormal) and his pulse is 82. His respiration is 18 and oxygen saturation is 98%.     Administered Clonidine 0.1 mg PO PRN  per physician order to Oli       Intervention:    Intervention to Oli's response: lower blood pressure.      Response:    Oli's response: Blood pressure 142/92.      Plan:     Continue to monitor per MD/PA/APRN orders; and reevaluate medication effectiveness within 30 minutes.    "

## 2024-05-04 NOTE — NURSING
"PRN Administration Note:    Behavior:    Patient (Oli Sanderson is a 63 y.o. male, : 1960, MRN: [id number])     Allergies: Patient has no known allergies.    Oli's  height is 5' 10" (1.778 m) and weight is 74 kg (163 lb 2.3 oz). His temperature is 98.2 °F (36.8 °C). His blood pressure is 165/104 (abnormal) and his pulse is 82. His respiration is 18 and oxygen saturation is 98%.     Reason for PRN Administration: blood pressure 165/104.    Intervention:    Administered Clonidine 0.1 mg PO PRN per physician order to Oli       Response:    Oli tolerated administration well.      Plan:     Continue to monitor per MD/PA/APRN orders; and reevaluate medication effectiveness within 30 minutes.    "

## 2024-05-04 NOTE — H&P
CONSULT NOTE    Admit Date: 5/2/2024   LOS: 2 days     SUBJECTIVE:     Follow-up For:  elevated blood pressure     Scheduled Meds:  Current Facility-Administered Medications   Medication Dose Route Frequency    EScitalopram oxalate  10 mg Oral Daily     Continuous Infusions:  Current Facility-Administered Medications   Medication Dose Route Frequency Last Rate Last Admin     PRN Meds:  Current Facility-Administered Medications:     acetaminophen, 650 mg, Oral, Q6H PRN    aluminum-magnesium hydroxide-simethicone, 30 mL, Oral, Q6H PRN    cloNIDine, 0.1 mg, Oral, Q8H PRN    cloNIDine, 0.2 mg, Oral, Q8H PRN    haloperidoL, 10 mg, Oral, Q4H PRN **AND** diphenhydrAMINE, 50 mg, Oral, Q4H PRN **AND** LORazepam, 2 mg, Oral, Q4H PRN **AND** haloperidol lactate, 10 mg, Intramuscular, Q4H PRN **AND** diphenhydrAMINE, 50 mg, Intramuscular, Q4H PRN **AND** lorazepam, 2 mg, Intramuscular, Q4H PRN    hydrOXYzine HCL, 50 mg, Oral, Q4H PRN    magnesium hydroxide 400 mg/5 ml, 30 mL, Oral, Daily PRN    ondansetron, 4 mg, Oral, Q6H PRN    traZODone, 100 mg, Oral, Nightly PRN    Review of patient's allergies indicates:  No Known Allergies    Review of Systems  ROS diastolic over 100    OBJECTIVE:     Vital Signs (Most Recent)  Temp: 98.2 °F (36.8 °C) (05/04/24 0701)  Pulse: 82 (05/04/24 0701)  Resp: 18 (05/04/24 0701)  BP: (!) 165/104 (05/04/24 0701)  SpO2: 98 % (05/04/24 0701)    Vital Signs Range (Last 24H):  Temp:  [98 °F (36.7 °C)-98.2 °F (36.8 °C)]   Pulse:  [82]   Resp:  [16-18]   BP: (151-180)/()   SpO2:  [96 %-98 %]     I & O (Last 24H):No intake or output data in the 24 hours ending 05/04/24 1143  Physical Exam:  Physical Exam blood pressure today is 165/104    Laboratory:  No results found for this or any previous visit (from the past 24 hour(s)).         ASSESSMENT/PLAN:     Essential hypertension

## 2024-05-04 NOTE — NURSING
"PRN Administration Note:    Behavior:    Patient (Oli Sanderson is a 63 y.o. male, : 1960, MRN: [id number])     Allergies: Patient has no known allergies.    Oli's  height is 5' 10" (1.778 m) and weight is 74 kg (163 lb 2.3 oz). His temperature is 98.2 °F (36.8 °C). His blood pressure is 165/104 (abnormal) and his pulse is 82. His respiration is 18 and oxygen saturation is 98%.     Reason for PRN Administration: lower extremity pain.    Intervention:    Administered Tylenol 650 mg PO PRN per physician order to Oli       Response:    Oli tolerated administration well.      Plan:     Continue to monitor per MD/PA/APRN orders; and reevaluate medication effectiveness within 30 minutes.    "

## 2024-05-05 LAB
RPR SER QL: NORMAL
RPR SER-TITR: NORMAL {TITER}

## 2024-05-05 PROCEDURE — 11400000 HC PSYCH PRIVATE ROOM

## 2024-05-05 PROCEDURE — 25000003 PHARM REV CODE 250: Performed by: PEDIATRICS

## 2024-05-05 PROCEDURE — 25000003 PHARM REV CODE 250: Performed by: PSYCHIATRY & NEUROLOGY

## 2024-05-05 PROCEDURE — 25000003 PHARM REV CODE 250

## 2024-05-05 RX ADMIN — HYDROXYZINE HYDROCHLORIDE 50 MG: 50 TABLET, FILM COATED ORAL at 08:05

## 2024-05-05 RX ADMIN — ACETAMINOPHEN 650 MG: 325 TABLET, FILM COATED ORAL at 08:05

## 2024-05-05 RX ADMIN — ESCITALOPRAM OXALATE 10 MG: 10 TABLET ORAL at 08:05

## 2024-05-05 RX ADMIN — TRAZODONE HYDROCHLORIDE 100 MG: 100 TABLET ORAL at 08:05

## 2024-05-05 RX ADMIN — CLONIDINE HYDROCHLORIDE 0.2 MG: 0.1 TABLET ORAL at 08:05

## 2024-05-05 RX ADMIN — LISINOPRIL 10 MG: 10 TABLET ORAL at 08:05

## 2024-05-05 NOTE — PLAN OF CARE
Problem: Adult Behavioral Health Plan of Care  Goal: Plan of Care Review  Outcome: Progressing  Flowsheets (Taken 5/5/2024 0824)  Patient Agreement with Plan of Care: agrees  Plan of Care Reviewed With: patient  Goal: Patient-Specific Goal (Individualization)  Outcome: Progressing  Flowsheets (Taken 5/5/2024 0824)  Patient Personal Strengths: independent living skills  Patient Vulnerabilities: substance abuse/addiction  Goal: Adheres to Safety Considerations for Self and Others  Outcome: Progressing  Flowsheets (Taken 5/5/2024 0824)  Adheres to Safety Considerations for Self and Others: making progress toward outcome  Intervention: Develop and Maintain Individualized Safety Plan  Flowsheets (Taken 5/5/2024 0824)  Safety Measures: safety rounds completed  Goal: Absence of New-Onset Illness or Injury  Outcome: Progressing  Intervention: Identify and Manage Fall Risk  Flowsheets (Taken 5/5/2024 0824)  Safety Measures: safety rounds completed  Intervention: Prevent VTE (Venous Thromboembolism)  Flowsheets (Taken 5/5/2024 0824)  VTE Prevention/Management: fluids promoted  Intervention: Prevent Infection  Flowsheets (Taken 5/5/2024 0824)  Infection Prevention: hand hygiene promoted  Goal: Optimized Coping Skills in Response to Life Stressors  Outcome: Progressing  Flowsheets (Taken 5/5/2024 0824)  Optimized Coping Skills in Response to Life Stressors: making progress toward outcome  Intervention: Promote Effective Coping Strategies  Flowsheets (Taken 5/5/2024 0824)  Supportive Measures: verbalization of feelings encouraged  Goal: Develops/Participates in Therapeutic [place] to Support Successful Transition  Outcome: Progressing  Flowsheets (Taken 5/5/2024 0824)  Develops/Participates in Therapeutic [place] to Support Successful Transition: making progress toward outcome  Intervention: Foster Therapeutic [place]  Flowsheets (Taken 5/5/2024 0824)  Trust Relationship/Rapport: care explained  Goal: Rounds/Family  Conference  Outcome: Progressing  Flowsheets (Taken 5/5/2024 0824)  Participants:   nursing   milieu/psych techs     Problem: Depressive Signs/Symptoms  Goal: Optimized Energy Level (Depressive Signs/Symptoms)  Outcome: Progressing  Flowsheets (Taken 5/5/2024 0824)  Mutually Determined Action Steps (Optimized Energy Level): grooms self without prompting  Intervention: Optimize Energy Level  Flowsheets (Taken 5/5/2024 0824)  Activity (Behavioral Health): up ad garth  Patient Performed Hygiene: teeth brushed  Diversional Activity: television  Goal: Optimized Cognitive Function (Depressive Signs/Symptoms)  Outcome: Progressing  Flowsheets (Taken 5/5/2024 0824)  Mutually Determined Action Steps (Optimized Cognitive Function): remains focused during activity  Goal: Increased Participation and Engagement (Depressive Signs/Symptoms)  Outcome: Progressing  Flowsheets (Taken 5/5/2024 0824)  Mutually Determined Action Steps (Increased Participation and Engagement): participates in one or more activity  Intervention: Facilitate Participation and Engagement  Flowsheets (Taken 5/5/2024 0824)  Supportive Measures: verbalization of feelings encouraged  Diversional Activity: television  Goal: Enhanced Self-Esteem and Confidence (Depressive Signs/Symptoms)  Outcome: Progressing  Flowsheets (Taken 5/5/2024 0824)  Mutually Determined Action Steps (Enhanced Self-Esteem and Confidence): identifies judgmental thoughts  Intervention: Promote Confidence and Self-Esteem  Flowsheets (Taken 5/5/2024 0824)  Supportive Measures: verbalization of feelings encouraged  Goal: Improved Mood Symptoms (Depressive Signs/Symptoms)  Outcome: Progressing  Flowsheets (Taken 5/5/2024 0824)  Mutually Determined Action Steps (Improved Mood Symptoms): identifies thought distortion  Intervention: Promote Mood Improvement  Flowsheets (Taken 5/5/2024 0824)  Supportive Measures: verbalization of feelings encouraged  Diversional Activity: television  Goal: Optimized  Nutrition Intake (Depressive Signs/Symptoms)  Outcome: Progressing  Flowsheets (Taken 5/5/2024 0824)  Mutually Determined Action Steps (Optimized Nutrition Intake): eats at meal time  Intervention: Promote Optimal Nutrition Intake  Flowsheets (Taken 5/5/2024 0824)  Nutrition Interventions: food preferences provided  Bowel Elimination Promotion: adequate fluid intake promoted  Goal: Improved Psychomotor Symptoms (Depressive Signs/Symptoms)  Outcome: Progressing  Flowsheets (Taken 5/5/2024 0824)  Mutually Determined Action Steps (Improved Psychomotor Symptoms): adheres to medication regimen  Intervention: Manage Psychomotor Movement  Flowsheets (Taken 5/5/2024 0824)  Activity (Behavioral Health): up ad garth  Patient Performed Hygiene: teeth brushed  Diversional Activity: television  Goal: Improved Sleep (Depressive Signs/Symptoms)  Outcome: Progressing  Flowsheets (Taken 5/5/2024 0824)  Mutually Determined Action Steps (Improved Sleep): sleeps 4-6 hours at night  Intervention: Promote Healthy Sleep Hygiene  Flowsheets (Taken 5/5/2024 0824)  Sleep Hygiene Promotion: regular sleep pattern promoted  Goal: Enhanced Social, Occupational or Functional Skills (Depressive Signs/Symptoms)  Outcome: Progressing  Flowsheets (Taken 5/5/2024 0824)  Mutually Determined Action Steps (Enhanced Social, Occupational or Functional Skills): identifies personal strengths  Intervention: Promote Social, Occupational and Functional Ability  Flowsheets (Taken 5/5/2024 0824)  Social Functional Ability Promotion: social interaction promoted     Problem: Excessive Substance Use  Goal: Optimized Energy Level (Excessive Substance Use)  Outcome: Progressing  Flowsheets (Taken 5/5/2024 0824)  Mutually Determined Action Steps (Optimized Energy Level): grooms self without prompting  Intervention: Optimize Energy Level  Flowsheets (Taken 5/5/2024 0824)  Activity (Behavioral Health): up ad garth  Patient Performed Hygiene: teeth brushed  Diversional  Activity: television  Goal: Improved Behavioral Control (Excessive Substance Use)  Outcome: Progressing  Flowsheets (Taken 5/5/2024 0824)  Mutually Determined Action Steps (Improved Behavioral Control): identifies major stressors  Intervention: Promote Behavior and Impulse Control  Flowsheets (Taken 5/5/2024 0824)  Behavior Management: behavioral plan reviewed  Goal: Increased Participation and Engagement (Excessive Substance Use)  Outcome: Progressing  Flowsheets (Taken 5/5/2024 0824)  Mutually Determined Action Steps (Increased Participation and Engagement): identifies support resources  Intervention: Facilitate Participation and Engagement  Flowsheets (Taken 5/5/2024 0824)  Supportive Measures: verbalization of feelings encouraged  Diversional Activity: television  Goal: Improved Physiologic Symptoms (Excessive Substance Use)  Outcome: Progressing  Flowsheets (Taken 5/5/2024 0824)  Mutually Determined Action Steps (Improved Physiologic Symptoms): discusses use pattern  Intervention: Optimize Physiologic Function  Flowsheets (Taken 5/5/2024 0824)  Nutrition Interventions: food preferences provided  Goal: Enhanced Social, Occupational or Functional Skills (Excessive Substance Use)  Outcome: Progressing  Flowsheets (Taken 5/5/2024 0824)  Mutually Determined Action Steps (Enhanced Social, Occupational or Functional Skills): identifies personal strengths  Intervention: Promote Social, Occupational and Functional Ability  Flowsheets (Taken 5/5/2024 0824)  Trust Relationship/Rapport: care explained  Social Functional Ability Promotion: social interaction promoted     Problem: Suicide Risk  Goal: Absence of Self-Harm  Outcome: Progressing  Intervention: Assess Risk to Self and Maintain Safety  Flowsheets (Taken 5/5/2024 0824)  Behavior Management: behavioral plan reviewed  Enhanced Safety Measures: monitored by video  Self-Harm Prevention: environmental self-harm risks assessed  Intervention: Promote Psychosocial  Wellbeing  Flowsheets (Taken 5/5/2024 0824)  Sleep/Rest Enhancement: regular sleep/rest pattern promoted  Supportive Measures: verbalization of feelings encouraged  Family/Support System Care: support provided  Intervention: Establish Safety Plan and Continuity of Care  Flowsheets (Taken 5/5/2024 0824)  Safe Transition Promotion: protective factors promoted    He is AAO X 4. Flat affect and blunted mood. Anxious. Denies SI, HI, hallucinations, and delusions. Good eye contact noted. Speech normal tone and speed. Interacts with selected patients and staff. Continue plan of care and provide a safe and therapeutic environment. Continue to monitor every fifteen minutes for safety.

## 2024-05-05 NOTE — NURSING
"PRN Administration Note:    Behavior:    Patient (Oli Sanderson is a 63 y.o. male, : 1960, MRN: [id number])     Allergies: Patient has no known allergies.    Oli's  height is 5' 10" (1.778 m) and weight is 74 kg (163 lb 2.3 oz). His temperature is 98.1 °F (36.7 °C). His blood pressure is 160/97 (abnormal) and his pulse is 83. His respiration is 18 and oxygen saturation is 96%.     Reason for PRN Administration: lower extremity pain.    Intervention:    Administered Tylenol 650 mg PO PRN  per physician order to Oli       Response:    Oli tolerated administration well.      Plan:     Continue to monitor per MD/PA/APRN orders; and reevaluate medication effectiveness within 30 minutes.    "

## 2024-05-05 NOTE — PROGRESS NOTES
05/05/24 3007   [facility] Group Therapy   Group Name Community Reintegration   Specific Interventions Relapse Prevention   Participation Level None   Participation Quality Sleeping   Insight/Motivation None;Other (see comments)  (didn't attend)

## 2024-05-05 NOTE — PLAN OF CARE
Nursing assessment completed as charted. Patient endorses anxiety,depression and sleep issues. Patient also verbalized level 8/10 back pain and was given PRN tylenol 650 mg. Patient was given PRN trazodone for sleep and PRN atarax for anxiety. No other needs verbalized at this time. Will continue to monitor   Problem: Depressive Signs/Symptoms  Goal: Optimized Energy Level (Depressive Signs/Symptoms)  Outcome: Progressing  Flowsheets (Taken 5/4/2024 2126)  Mutually Determined Action Steps (Optimized Energy Level): grooms self without prompting  Intervention: Optimize Energy Level  Flowsheets (Taken 5/4/2024 2126)  Activity (Behavioral Health): up ad garth  Diversional Activity: television     Problem: Suicide Risk  Goal: Absence of Self-Harm  Outcome: Progressing  Intervention: Promote Psychosocial Wellbeing  Flowsheets (Taken 5/4/2024 2126)  Sleep/Rest Enhancement:   awakenings minimized   room darkened  Supportive Measures:   verbalization of feelings encouraged   active listening utilized   self-care encouraged  Family/Support System Care: support provided

## 2024-05-05 NOTE — NURSING
"PRN Medication Follow-up Note:    Behavior:    Patient (Oli Sanderson is a 63 y.o. male, : 1960, MRN: [id number])     Allergies: Patient has no known allergies.    Oli's  height is 5' 10" (1.778 m) and weight is 74 kg (163 lb 2.3 oz). His temperature is 98.1 °F (36.7 °C). His blood pressure is 160/97 (abnormal) and his pulse is 83. His respiration is 18 and oxygen saturation is 96%.     Administered Tylenol 650 mg PO PRN per physician order to Oli       Intervention:    Intervention to Oli's response: relief of lower extremity pain.      Response:    Oli's response: minimal relief of lower extremity pain.      Plan:     Continue to monitor per MD/PA/APRN orders; and reevaluate medication effectiveness within 30 minutes.    "

## 2024-05-06 PROCEDURE — 25000003 PHARM REV CODE 250

## 2024-05-06 PROCEDURE — 11400000 HC PSYCH PRIVATE ROOM

## 2024-05-06 PROCEDURE — 25000003 PHARM REV CODE 250: Performed by: PEDIATRICS

## 2024-05-06 PROCEDURE — 25000003 PHARM REV CODE 250: Performed by: PSYCHIATRY & NEUROLOGY

## 2024-05-06 RX ORDER — MUPIROCIN 20 MG/G
OINTMENT TOPICAL 2 TIMES DAILY
Status: CANCELLED | OUTPATIENT
Start: 2024-05-06 | End: 2024-05-11

## 2024-05-06 RX ORDER — ESCITALOPRAM OXALATE 10 MG/1
20 TABLET ORAL DAILY
Status: DISCONTINUED | OUTPATIENT
Start: 2024-05-07 | End: 2024-05-09 | Stop reason: HOSPADM

## 2024-05-06 RX ADMIN — LISINOPRIL 10 MG: 10 TABLET ORAL at 09:05

## 2024-05-06 RX ADMIN — HYDROXYZINE HYDROCHLORIDE 50 MG: 50 TABLET, FILM COATED ORAL at 08:05

## 2024-05-06 RX ADMIN — ACETAMINOPHEN 650 MG: 325 TABLET, FILM COATED ORAL at 09:05

## 2024-05-06 RX ADMIN — ACETAMINOPHEN 650 MG: 325 TABLET, FILM COATED ORAL at 08:05

## 2024-05-06 RX ADMIN — ACETAMINOPHEN 650 MG: 325 TABLET, FILM COATED ORAL at 12:05

## 2024-05-06 RX ADMIN — TRAZODONE HYDROCHLORIDE 100 MG: 100 TABLET ORAL at 08:05

## 2024-05-06 RX ADMIN — ALUMINUM HYDROXIDE, MAGNESIUM HYDROXIDE, AND SIMETHICONE 30 ML: 200; 200; 20 SUSPENSION ORAL at 03:05

## 2024-05-06 RX ADMIN — ESCITALOPRAM OXALATE 10 MG: 10 TABLET ORAL at 09:05

## 2024-05-06 RX ADMIN — CLONIDINE HYDROCHLORIDE 0.2 MG: 0.1 TABLET ORAL at 08:05

## 2024-05-06 NOTE — PROGRESS NOTES
05/06/24 1500   [facility] Group Therapy   Group Name Therapeutic Recreation   Specific Interventions Skilled Activity Leisure Education and Awareness   Participation Level Supportive;Appropriate;Attentive   Participation Quality Cooperative   Insight/Motivation Improved   Affect/Mood Display Appropriate   Cognition Oriented;Alert   Psychomotor WNL

## 2024-05-06 NOTE — PLAN OF CARE
Problem: Adult Behavioral Health Plan of Care  Goal: Plan of Care Review  Outcome: Met  Goal: Patient-Specific Goal (Individualization)  Outcome: Met  Goal: Adheres to Safety Considerations for Self and Others  Outcome: Met  Goal: Absence of New-Onset Illness or Injury  Outcome: Met  Goal: Optimized Coping Skills in Response to Life Stressors  Outcome: Met  Goal: Develops/Participates in Therapeutic [place] to Support Successful Transition  Outcome: Met  Goal: Rounds/Family Conference  Outcome: Met     Problem: Depressive Signs/Symptoms  Goal: Optimized Energy Level (Depressive Signs/Symptoms)  Outcome: Progressing  Goal: Optimized Cognitive Function (Depressive Signs/Symptoms)  Outcome: Progressing  Goal: Increased Participation and Engagement (Depressive Signs/Symptoms)  Outcome: Progressing  Goal: Enhanced Self-Esteem and Confidence (Depressive Signs/Symptoms)  Outcome: Progressing  Goal: Improved Mood Symptoms (Depressive Signs/Symptoms)  Outcome: Progressing  Goal: Optimized Nutrition Intake (Depressive Signs/Symptoms)  Outcome: Progressing  Goal: Improved Psychomotor Symptoms (Depressive Signs/Symptoms)  Outcome: Progressing  Goal: Improved Sleep (Depressive Signs/Symptoms)  Outcome: Progressing  Goal: Enhanced Social, Occupational or Functional Skills (Depressive Signs/Symptoms)  Outcome: Progressing     Problem: Excessive Substance Use  Goal: Optimized Energy Level (Excessive Substance Use)  Outcome: Progressing  Goal: Improved Behavioral Control (Excessive Substance Use)  Outcome: Progressing  Goal: Increased Participation and Engagement (Excessive Substance Use)  Outcome: Progressing  Goal: Improved Physiologic Symptoms (Excessive Substance Use)  Outcome: Progressing  Goal: Enhanced Social, Occupational or Functional Skills (Excessive Substance Use)  Outcome: Progressing     Problem: Suicide Risk  Goal: Absence of Self-Harm  Outcome: Met   Care plan adjusted with patient particiation

## 2024-05-06 NOTE — PROGRESS NOTES
"5/6/2024  Oli Sanderson   1960   [id number]        Psychiatry Progress Note     Chief Complaint: "It's been all right"    SUBJECTIVE:   Oli Sanderson is a 63 y.o. male placed under a P[facility] at [facility] with suicidal ideation with an attempt to OD on sleeping pills.     Today, he states that he is interested in going to rehab.  Staff currently working on options as he does not currently have insurance.  Tolerating current medication regimen without issues.  Suicidal ideation improving.  Constricted affect.  Poor home compliance with treatment and was on no psychiatric medication prior to his arrival.  Will increase Lexapro and will follow-up with discharge planning.      UDS: (+)cocaine  Blood alcohol: <10    Current Medications:   Scheduled Meds:   Current Facility-Administered Medications   Medication Dose Route Frequency    EScitalopram oxalate  10 mg Oral Daily    lisinopriL  10 mg Oral Daily      PRN Meds:   Current Facility-Administered Medications:     acetaminophen, 650 mg, Oral, Q6H PRN    aluminum-magnesium hydroxide-simethicone, 30 mL, Oral, Q6H PRN    cloNIDine, 0.1 mg, Oral, Q8H PRN    cloNIDine, 0.2 mg, Oral, Q8H PRN    haloperidoL, 10 mg, Oral, Q4H PRN **AND** diphenhydrAMINE, 50 mg, Oral, Q4H PRN **AND** LORazepam, 2 mg, Oral, Q4H PRN **AND** haloperidol lactate, 10 mg, Intramuscular, Q4H PRN **AND** diphenhydrAMINE, 50 mg, Intramuscular, Q4H PRN **AND** lorazepam, 2 mg, Intramuscular, Q4H PRN    hydrOXYzine HCL, 50 mg, Oral, Q4H PRN    magnesium hydroxide 400 mg/5 ml, 30 mL, Oral, Daily PRN    ondansetron, 4 mg, Oral, Q6H PRN    traZODone, 100 mg, Oral, Nightly PRN   Psychotherapeutics (From admission, onward)      Start     Stop Route Frequency Ordered    05/03/24 1115  EScitalopram oxalate tablet 10 mg         -- Oral Daily 05/03/24 1002    05/02/24 1131  traZODone tablet 100 mg         -- Oral Nightly PRN 05/02/24 1109    05/02/24 1129  haloperidoL tablet 10 mg  (Med - Acute  Behavioral Management)   " "     Placed in "And" Linked Group    -- Oral Every 4 hours PRN 05/02/24 1109    05/02/24 1129  LORazepam tablet 2 mg  (Med - Acute  Behavioral Management)        Placed in "And" Linked Group    -- Oral Every 4 hours PRN 05/02/24 1109    05/02/24 1129  haloperidol lactate injection 10 mg  (Med - Acute  Behavioral Management)        Placed in "And" Linked Group    -- IM Every 4 hours PRN 05/02/24 1109    05/02/24 1129  LORazepam injection 2 mg  (Med - Acute  Behavioral Management)        Placed in "And" Linked Group    -- IM Every 4 hours PRN 05/02/24 1109            Allergies:   Review of patient's allergies indicates:  No Known Allergies     OBJECTIVE:   Vitals   Vitals:    05/06/24 0701   BP: (!) 155/93   Pulse: 73   Resp: 18   Temp: 97.5 °F (36.4 °C)        Labs/Imaging/Studies:   No results found for this or any previous visit (from the past 36 hour(s)).       Medical Review Of Systems:  Constitutional: negative  Respiratory: negative  Cardiovascular: negative  Gastrointestinal: negative  Genitourinary:negative  Musculoskeletal:negative  Neurological: negative       Psychiatric Mental Status Exam:  General Appearance: appears stated age, well-developed, well-nourished  Arousal: alert  Behavior: cooperative  Movements and Motor Activity: no abnormal involuntary movements noted  Orientation: oriented to person, place, time, and situation  Speech: normal rate, normal rhythm, normal volume, normal tone  Mood: "Depression"  Affect: constricted  Thought Process: linear  Associations: intact  Thought Content and Perceptions: suicidal ideation improving, no homicidal ideation, no auditory hallucinations, no visual hallucinations, no paranoid ideation, no ideas of reference  Recent and Remote Memory: recent memory intact, remote memory intact; per interview/observation with patient  Attention and Concentration: intact, attentive to conversation; per interview/observation with patient  Fund of Knowledge: intact, aware of " current events, vocabulary appropriate; based on history, vocabulary, fund of knowledge, syntax, grammar, and content  Insight: questionable; based on understanding of severity of illness and HPI  Judgment: questionable; based on patient's behavior and HPI    ASSESSMENT/PLAN:   Problems Addressed/Diagnoses:  Major Depressive Disorder, recurrent, severe (F33.2)  Alcohol use disorder (F10.10)  Cocaine use disorder (F14.20)     Past Medical History:   Diagnosis Date    Hypertension         Plan:  Depression, chronic with acute exacerbation  -Increase Lexapro to 20mg daily    Alcohol use, chronic  -Group/Individual psychotherapy    Cocaine use, chronic with acute exacerbation  -Group/Individual psychotherapy     Expected Disposition Plan:  Rehab?        Boogie Lees M.D.

## 2024-05-06 NOTE — PROGRESS NOTES
05/06/24 1000   [email or web address] Group Therapy   Group Name Therapeutic Recreation   Specific Interventions Skilled Activity Mild Exercises   Participation Level None   Participation Quality Lack of Interest;Withdrawn   Insight/Motivation Improved   Affect/Mood Display Appropriate;Flat   Cognition Oriented;Alert   Psychomotor WNL

## 2024-05-06 NOTE — PLAN OF CARE
Nursing assessment completed as charted. Patient denies SI,HI and AVH. Patient endorses anxiety, sleep disturbances and depression. Patient was given PRN trazodone for sleep and PRN atarax for anxiety. Will continue to monitor   Problem: Depressive Signs/Symptoms  Goal: Optimized Energy Level (Depressive Signs/Symptoms)  Outcome: Progressing  Flowsheets (Taken 5/5/2024 2125)  Mutually Determined Action Steps (Optimized Energy Level): grooms self without prompting  Intervention: Optimize Energy Level  Flowsheets (Taken 5/5/2024 2125)  Activity (Behavioral Health): up ad garth  Diversional Activity: television  Goal: Improved Mood Symptoms (Depressive Signs/Symptoms)  Outcome: Progressing  Flowsheets (Taken 5/5/2024 2125)  Mutually Determined Action Steps (Improved Mood Symptoms): verbalizes increased insight  Intervention: Promote Mood Improvement  Flowsheets (Taken 5/5/2024 2125)  Supportive Measures:   active listening utilized   decision-making supported   self-care encouraged   verbalization of feelings encouraged  Diversional Activity: television

## 2024-05-07 PROCEDURE — 25000003 PHARM REV CODE 250: Performed by: PEDIATRICS

## 2024-05-07 PROCEDURE — 11400000 HC PSYCH PRIVATE ROOM

## 2024-05-07 PROCEDURE — 25000003 PHARM REV CODE 250: Performed by: PSYCHIATRY & NEUROLOGY

## 2024-05-07 RX ADMIN — TRAZODONE HYDROCHLORIDE 100 MG: 100 TABLET ORAL at 08:05

## 2024-05-07 RX ADMIN — ESCITALOPRAM OXALATE 20 MG: 10 TABLET ORAL at 08:05

## 2024-05-07 RX ADMIN — ACETAMINOPHEN 650 MG: 325 TABLET, FILM COATED ORAL at 08:05

## 2024-05-07 RX ADMIN — LISINOPRIL 10 MG: 10 TABLET ORAL at 08:05

## 2024-05-07 RX ADMIN — ACETAMINOPHEN 650 MG: 325 TABLET, FILM COATED ORAL at 05:05

## 2024-05-07 RX ADMIN — CLONIDINE HYDROCHLORIDE 0.2 MG: 0.1 TABLET ORAL at 08:05

## 2024-05-07 RX ADMIN — HYDROXYZINE HYDROCHLORIDE 50 MG: 50 TABLET, FILM COATED ORAL at 08:05

## 2024-05-07 RX ADMIN — CLONIDINE HYDROCHLORIDE 0.1 MG: 0.1 TABLET ORAL at 05:05

## 2024-05-07 NOTE — PLAN OF CARE
Problem: Depressive Signs/Symptoms  Goal: Optimized Energy Level (Depressive Signs/Symptoms)  Outcome: Progressing  Flowsheets (Taken 5/6/2024 2244)  Mutually Determined Action Steps (Optimized Energy Level): grooms self without prompting  Intervention: Optimize Energy Level  Flowsheets (Taken 5/6/2024 2244)  Activity (Behavioral Health): activity encouraged  Diversional Activity: television  Goal: Optimized Cognitive Function (Depressive Signs/Symptoms)  Outcome: Progressing  Goal: Increased Participation and Engagement (Depressive Signs/Symptoms)  Outcome: Progressing  Goal: Enhanced Self-Esteem and Confidence (Depressive Signs/Symptoms)  Outcome: Progressing  Goal: Improved Mood Symptoms (Depressive Signs/Symptoms)  Outcome: Progressing  Goal: Optimized Nutrition Intake (Depressive Signs/Symptoms)  Outcome: Progressing  Goal: Improved Psychomotor Symptoms (Depressive Signs/Symptoms)  Outcome: Progressing  Goal: Improved Sleep (Depressive Signs/Symptoms)  Outcome: Progressing  Goal: Enhanced Social, Occupational or Functional Skills (Depressive Signs/Symptoms)  Outcome: Progressing     Problem: Excessive Substance Use  Goal: Optimized Energy Level (Excessive Substance Use)  Outcome: Progressing  Goal: Improved Behavioral Control (Excessive Substance Use)  Outcome: Progressing  Goal: Increased Participation and Engagement (Excessive Substance Use)  Outcome: Progressing  Goal: Improved Physiologic Symptoms (Excessive Substance Use)  Outcome: Progressing  Goal: Enhanced Social, Occupational or Functional Skills (Excessive Substance Use)  Outcome: Progressing    Pt sitting in day room watching tv, calm cooperative, depressed mood with flat affect, endorses anxiety and depression. Denies SI/HI and A/V/H. Encouraged positive behavior and med compliance. Pt c/o bilateral leg pain @ 2000 gave tylenol 650 mg po, and gave atarax 50 mg po for anxiety,trazodone 100 mg po, for insomnia and clonidine 0.2 mg po for elevated  B/P see mar. Plan of care ongoing, Sp active,  q 15 min obs active, safety maintained. 2200, Pt resting in bed with eyes closed, visible rise and fall of chest observed, resp even and unlabored. No distress noted, safety maintained.

## 2024-05-07 NOTE — PROGRESS NOTES
05/07/24 1000   [facility] Group Therapy   Group Name Therapeutic Recreation   Specific Interventions Skilled Activity Mild Exercises   Participation Level Active;Supportive;Appropriate;Attentive;Sharing   Participation Quality Cooperative;Social   Insight/Motivation Improved   Affect/Mood Display Appropriate   Cognition Oriented;Alert   Psychomotor WNL

## 2024-05-07 NOTE — PROGRESS NOTES
"5/7/2024  Oli Sanderson   1960   [id number]        Psychiatry Progress Note     Chief Complaint: "better"    SUBJECTIVE:   Oli Sanderson is a 63 y.o. male placed under a PEC at [facility] with suicidal ideation with an attempt to OD on sleeping pills.     Today, patient states that he is feeling better. Tolerating current medication regimen without issues. Suicidal ideation improving.  Constricted affect.  Eating well. States that he is not sleeping well but this appears to be due to the chronic back pain. Will continue current POC and will follow-up with discharge planning.      UDS: (+)cocaine  Blood alcohol: <10    Current Medications:   Scheduled Meds:    EScitalopram oxalate  20 mg Oral Daily    lisinopriL  10 mg Oral Daily      PRN Meds:   Current Facility-Administered Medications:     acetaminophen, 650 mg, Oral, Q6H PRN    aluminum-magnesium hydroxide-simethicone, 30 mL, Oral, Q6H PRN    cloNIDine, 0.1 mg, Oral, Q8H PRN    cloNIDine, 0.2 mg, Oral, Q8H PRN    haloperidoL, 10 mg, Oral, Q4H PRN **AND** diphenhydrAMINE, 50 mg, Oral, Q4H PRN **AND** LORazepam, 2 mg, Oral, Q4H PRN **AND** haloperidol lactate, 10 mg, Intramuscular, Q4H PRN **AND** diphenhydrAMINE, 50 mg, Intramuscular, Q4H PRN **AND** lorazepam, 2 mg, Intramuscular, Q4H PRN    hydrOXYzine HCL, 50 mg, Oral, Q4H PRN    magnesium hydroxide 400 mg/5 ml, 30 mL, Oral, Daily PRN    ondansetron, 4 mg, Oral, Q6H PRN    traZODone, 100 mg, Oral, Nightly PRN   Psychotherapeutics (From admission, onward)      Start     Stop Route Frequency Ordered    05/07/24 0900  EScitalopram oxalate tablet 20 mg         -- Oral Daily 05/06/24 0948    05/02/24 1131  traZODone tablet 100 mg         -- Oral Nightly PRN 05/02/24 1109    05/02/24 1129  haloperidoL tablet 10 mg  (Med - Acute  Behavioral Management)        Placed in "And" Linked Group    -- Oral Every 4 hours PRN 05/02/24 1109    05/02/24 1129  LORazepam tablet 2 mg  (Med - Acute  Behavioral Management)        Placed " "in "And" Linked Group    -- Oral Every 4 hours PRN 05/02/24 1109    05/02/24 1129  haloperidol lactate injection 10 mg  (Med - Acute  Behavioral Management)        Placed in "And" Linked Group    -- IM Every 4 hours PRN 05/02/24 1109    05/02/24 1129  LORazepam injection 2 mg  (Med - Acute  Behavioral Management)        Placed in "And" Linked Group    -- IM Every 4 hours PRN 05/02/24 1109            Allergies:   Review of patient's allergies indicates:  No Known Allergies     OBJECTIVE:   Vitals   Vitals:    05/07/24 0701   BP: (!) 173/99   Pulse: 84   Resp:    Temp: 97.9 °F (36.6 °C)        Labs/Imaging/Studies:   No results found for this or any previous visit (from the past 36 hour(s)).       Medical Review Of Systems:  Constitutional: negative  Respiratory: negative  Cardiovascular: negative  Gastrointestinal: negative  Genitourinary:negative  Musculoskeletal:negative  Neurological: negative       Psychiatric Mental Status Exam:  General Appearance: appears stated age, well-developed, well-nourished  Arousal: alert  Behavior: cooperative  Movements and Motor Activity: no abnormal involuntary movements noted  Orientation: oriented to person, place, time, and situation  Speech: normal rate, normal rhythm, normal volume, normal tone  Mood: "Depression"  Affect: constricted  Thought Process: linear  Associations: intact  Thought Content and Perceptions: suicidal ideation improving, no homicidal ideation, no auditory hallucinations, no visual hallucinations, no paranoid ideation, no ideas of reference  Recent and Remote Memory: recent memory intact, remote memory intact; per interview/observation with patient  Attention and Concentration: intact, attentive to conversation; per interview/observation with patient  Fund of Knowledge: intact, aware of current events, vocabulary appropriate; based on history, vocabulary, fund of knowledge, syntax, grammar, and content  Insight: questionable; based on understanding of " severity of illness and HPI  Judgment: questionable; based on patient's behavior and HPI    ASSESSMENT/PLAN:   Problems Addressed/Diagnoses:  Major Depressive Disorder, recurrent, severe (F33.2)  Alcohol use disorder (F10.10)  Cocaine use disorder (F14.20)     Past Medical History:   Diagnosis Date    Hypertension         Plan:  Depression, chronic with acute exacerbation  -Lexapro 20mg daily    Alcohol use, chronic  -Group/Individual psychotherapy    Cocaine use, chronic with acute exacerbation  -Group/Individual psychotherapy     Expected Disposition Plan:  Rehab?        GRADY KapadiaP-BC

## 2024-05-07 NOTE — NURSING
"PRN Administration Note:    Behavior:    Patient (Oli Sanderson is a 63 y.o. male, : 1960, MRN: [id number])     Allergies: Patient has no known allergies.    Oli's  height is 5' 10" (1.778 m) and weight is 74 kg (163 lb 2.3 oz). His temperature is 97.9 °F (36.6 °C). His blood pressure is 193/97 (abnormal) and his pulse is 84. His respiration is 18 and oxygen saturation is 96%.     Reason for PRN Administration: blood pressure 193/97.    Intervention:    Administered Clonidine 0.1 mg PO PRN per physician order to Oli       Response:    Oli tolerated administration well.      Plan:     Continue to monitor per MD/PA/APRN orders; and reevaluate medication effectiveness.    "

## 2024-05-07 NOTE — NURSING
"PRN Administration Note:    Behavior:    Patient (Oli Sanderson is a 63 y.o. male, : 1960, MRN: [id number])     Allergies: Patient has no known allergies.    Oli's  height is 5' 10" (1.778 m) and weight is 74 kg (163 lb 2.3 oz). His temperature is 97.9 °F (36.6 °C). His blood pressure is 193/97 (abnormal) and his pulse is 84. His respiration is 18 and oxygen saturation is 96%.     Reason for PRN Administration: back pain.    Intervention:    Administered Tylenol 650 mg PO PRN per physician order to Oli       Response:    Oli tolerated administration well.      Plan:     Continue to monitor per MD/PA/APRN orders; and reevaluate medication effectiveness within 30 minutes.    "

## 2024-05-07 NOTE — PLAN OF CARE
Problem: Depressive Signs/Symptoms  Goal: Optimized Energy Level (Depressive Signs/Symptoms)  Outcome: Not Progressing  Flowsheets (Taken 5/7/2024 1825)  Mutually Determined Action Steps (Optimized Energy Level): grooms self without prompting  Intervention: Optimize Energy Level  Flowsheets (Taken 5/7/2024 1825)  Activity (Behavioral Health): up ad garth  Diversional Activity: television  Goal: Optimized Cognitive Function (Depressive Signs/Symptoms)  Outcome: Not Progressing  Flowsheets (Taken 5/7/2024 1825)  Mutually Determined Action Steps (Optimized Cognitive Function): participates in attention training  Goal: Increased Participation and Engagement (Depressive Signs/Symptoms)  Outcome: Not Progressing  Flowsheets (Taken 5/7/2024 1825)  Mutually Determined Action Steps (Increased Participation and Engagement): identifies alternatives to withdrawal  Intervention: Facilitate Participation and Engagement  Flowsheets (Taken 5/7/2024 1825)  Supportive Measures:   self-care encouraged   relaxation techniques promoted  Diversional Activity: television  Goal: Enhanced Self-Esteem and Confidence (Depressive Signs/Symptoms)  Outcome: Not Progressing  Flowsheets (Taken 5/7/2024 1825)  Mutually Determined Action Steps (Enhanced Self-Esteem and Confidence): verbalizes successes  Intervention: Promote Confidence and Self-Esteem  Flowsheets (Taken 5/7/2024 1825)  Supportive Measures:   self-care encouraged   relaxation techniques promoted  Goal: Improved Mood Symptoms (Depressive Signs/Symptoms)  Outcome: Not Progressing  Flowsheets (Taken 5/7/2024 1825)  Mutually Determined Action Steps (Improved Mood Symptoms): acknowledges progress  Intervention: Promote Mood Improvement  Flowsheets (Taken 5/7/2024 1825)  Supportive Measures:   self-care encouraged   relaxation techniques promoted  Diversional Activity: television  Goal: Optimized Nutrition Intake (Depressive Signs/Symptoms)  Outcome: Not Progressing  Flowsheets (Taken  5/7/2024 1825)  Mutually Determined Action Steps (Optimized Nutrition Intake): maintains baseline weight  Goal: Improved Psychomotor Symptoms (Depressive Signs/Symptoms)  Outcome: Not Progressing  Flowsheets (Taken 5/7/2024 1825)  Mutually Determined Action Steps (Improved Psychomotor Symptoms): adheres to medication regimen  Goal: Improved Sleep (Depressive Signs/Symptoms)  Outcome: Not Progressing  Goal: Enhanced Social, Occupational or Functional Skills (Depressive Signs/Symptoms)  Outcome: Not Progressing     Problem: Excessive Substance Use  Goal: Optimized Energy Level (Excessive Substance Use)  Outcome: Not Progressing  Goal: Improved Behavioral Control (Excessive Substance Use)  Outcome: Not Progressing  Goal: Increased Participation and Engagement (Excessive Substance Use)  Outcome: Not Progressing  Goal: Improved Physiologic Symptoms (Excessive Substance Use)  Outcome: Not Progressing  Goal: Enhanced Social, Occupational or Functional Skills (Excessive Substance Use)  Outcome: Not Progressing  Patient is cooperative and medication compliant.  Says he's feeling better.

## 2024-05-07 NOTE — PROGRESS NOTES
05/07/24 1400   [facility] Group Therapy   Group Name Therapeutic Recreation   Specific Interventions Skilled Activity Leisure Education and Awareness   Participation Level None   Participation Quality Refused

## 2024-05-07 NOTE — PLAN OF CARE
Problem: Depressive Signs/Symptoms  Goal: Optimized Energy Level (Depressive Signs/Symptoms)  Outcome: Not Progressing  Flowsheets (Taken 5/7/2024 1825)  Mutually Determined Action Steps (Optimized Energy Level): grooms self without prompting  Intervention: Optimize Energy Level  Flowsheets (Taken 5/7/2024 1825)  Activity (Behavioral Health): up ad garth  Diversional Activity: television  Goal: Optimized Cognitive Function (Depressive Signs/Symptoms)  Outcome: Not Progressing  Flowsheets (Taken 5/7/2024 1825)  Mutually Determined Action Steps (Optimized Cognitive Function): participates in attention training  Goal: Increased Participation and Engagement (Depressive Signs/Symptoms)  Outcome: Not Progressing  Flowsheets (Taken 5/7/2024 1825)  Mutually Determined Action Steps (Increased Participation and Engagement): identifies alternatives to withdrawal  Intervention: Facilitate Participation and Engagement  Flowsheets (Taken 5/7/2024 1825)  Supportive Measures:   self-care encouraged   relaxation techniques promoted  Diversional Activity: television  Goal: Enhanced Self-Esteem and Confidence (Depressive Signs/Symptoms)  Outcome: Not Progressing  Flowsheets (Taken 5/7/2024 1825)  Mutually Determined Action Steps (Enhanced Self-Esteem and Confidence): verbalizes successes  Intervention: Promote Confidence and Self-Esteem  Flowsheets (Taken 5/7/2024 1825)  Supportive Measures:   self-care encouraged   relaxation techniques promoted  Goal: Improved Mood Symptoms (Depressive Signs/Symptoms)  Outcome: Not Progressing  Flowsheets (Taken 5/7/2024 1825)  Mutually Determined Action Steps (Improved Mood Symptoms): acknowledges progress  Intervention: Promote Mood Improvement  Flowsheets (Taken 5/7/2024 1825)  Supportive Measures:   self-care encouraged   relaxation techniques promoted  Diversional Activity: television  Goal: Optimized Nutrition Intake (Depressive Signs/Symptoms)  Outcome: Not Progressing  Flowsheets (Taken  5/7/2024 1825)  Mutually Determined Action Steps (Optimized Nutrition Intake): maintains baseline weight  Goal: Improved Psychomotor Symptoms (Depressive Signs/Symptoms)  Outcome: Not Progressing  Flowsheets (Taken 5/7/2024 1825)  Mutually Determined Action Steps (Improved Psychomotor Symptoms): adheres to medication regimen  Goal: Improved Sleep (Depressive Signs/Symptoms)  Outcome: Not Progressing  Goal: Enhanced Social, Occupational or Functional Skills (Depressive Signs/Symptoms)  Outcome: Not Progressing     Problem: Excessive Substance Use  Goal: Optimized Energy Level (Excessive Substance Use)  Outcome: Not Progressing  Goal: Improved Behavioral Control (Excessive Substance Use)  Outcome: Not Progressing  Goal: Increased Participation and Engagement (Excessive Substance Use)  Outcome: Not Progressing  Goal: Improved Physiologic Symptoms (Excessive Substance Use)  Outcome: Not Progressing  Goal: Enhanced Social, Occupational or Functional Skills (Excessive Substance Use)  Outcome: Not Progressing

## 2024-05-07 NOTE — NURSING
"PRN Medication Follow-up Note:    Behavior:    Patient (Oli Sanderson is a 63 y.o. male, : 1960, MRN: [id number])     Allergies: Patient has no known allergies.    [place]  height is 5' 10" (1.778 m) and weight is 74 kg (163 lb 2.3 oz). His temperature is 97.9 °F (36.6 °C). His blood pressure is 193/97 (abnormal) and his pulse is 84. His respiration is 18 and oxygen saturation is 96%.     Administered Tylenol 650 mg PO PRN per physician order to Oli       Intervention:    Intervention to Oli's response: relief of pain.      Response:    Oli's response: relief of pain.      Plan:     Continue to monitor per MD/PA/APRN orders; and reevaluate medication effectiveness within 30 minutes.    "

## 2024-05-08 PROBLEM — I10 HTN (HYPERTENSION): Status: ACTIVE | Noted: 2024-05-08

## 2024-05-08 PROBLEM — E87.6 HYPOKALEMIA: Status: ACTIVE | Noted: 2024-05-08

## 2024-05-08 LAB — T PALLIDUM AB SER QL AGGL: POSITIVE

## 2024-05-08 PROCEDURE — 25000003 PHARM REV CODE 250: Performed by: FAMILY MEDICINE

## 2024-05-08 PROCEDURE — 25000003 PHARM REV CODE 250: Performed by: PSYCHIATRY & NEUROLOGY

## 2024-05-08 PROCEDURE — 11400000 HC PSYCH PRIVATE ROOM

## 2024-05-08 PROCEDURE — 25000003 PHARM REV CODE 250: Performed by: PEDIATRICS

## 2024-05-08 RX ORDER — POTASSIUM CHLORIDE 20 MEQ/1
20 TABLET, EXTENDED RELEASE ORAL DAILY
Status: DISCONTINUED | OUTPATIENT
Start: 2024-05-09 | End: 2024-05-09 | Stop reason: HOSPADM

## 2024-05-08 RX ORDER — AMLODIPINE BESYLATE 5 MG/1
5 TABLET ORAL DAILY
Status: DISCONTINUED | OUTPATIENT
Start: 2024-05-08 | End: 2024-05-09 | Stop reason: HOSPADM

## 2024-05-08 RX ORDER — METOPROLOL SUCCINATE 25 MG/1
25 TABLET, EXTENDED RELEASE ORAL DAILY
Status: DISCONTINUED | OUTPATIENT
Start: 2024-05-09 | End: 2024-05-09 | Stop reason: HOSPADM

## 2024-05-08 RX ORDER — IBUPROFEN 400 MG/1
400 TABLET ORAL EVERY 6 HOURS PRN
Status: DISCONTINUED | OUTPATIENT
Start: 2024-05-08 | End: 2024-05-09 | Stop reason: HOSPADM

## 2024-05-08 RX ORDER — POTASSIUM CHLORIDE 20 MEQ/1
20 TABLET, EXTENDED RELEASE ORAL DAILY
Status: DISCONTINUED | OUTPATIENT
Start: 2024-05-09 | End: 2024-05-08

## 2024-05-08 RX ADMIN — ACETAMINOPHEN 650 MG: 325 TABLET, FILM COATED ORAL at 08:05

## 2024-05-08 RX ADMIN — CLONIDINE HYDROCHLORIDE 0.1 MG: 0.1 TABLET ORAL at 08:05

## 2024-05-08 RX ADMIN — ESCITALOPRAM OXALATE 20 MG: 10 TABLET ORAL at 08:05

## 2024-05-08 RX ADMIN — TRAZODONE HYDROCHLORIDE 100 MG: 100 TABLET ORAL at 08:05

## 2024-05-08 RX ADMIN — IBUPROFEN 400 MG: 400 TABLET, FILM COATED ORAL at 08:05

## 2024-05-08 RX ADMIN — LISINOPRIL 10 MG: 10 TABLET ORAL at 08:05

## 2024-05-08 RX ADMIN — ALUMINUM HYDROXIDE, MAGNESIUM HYDROXIDE, AND SIMETHICONE 30 ML: 200; 200; 20 SUSPENSION ORAL at 09:05

## 2024-05-08 RX ADMIN — AMLODIPINE BESYLATE 5 MG: 5 TABLET ORAL at 08:05

## 2024-05-08 NOTE — NURSING
5/7/24 2007- Pt calm cooperative, depressed mood with flat affect, endorses anxiety and depression. Denies SI/HI and A/V/H. Encouraged positive behavior and med compliance. Gave atarax 50 mg po for anxiety,trazodone 100 mg po, for insomnia and clonidine 0.2 mg po for elevated B/P see mar. Plan of care ongoing, Sp active,  q 15 min obs active, safety maintained.   2145- BP rechecked, 146/94      2200, Pt resting in bed with eyes closed, visible rise and fall of chest observed, resp even and unlabored. No distress noted, safety maintained.

## 2024-05-08 NOTE — NURSING
Treatment Team Note:      Behavior:    Patient (Oli Sanderson is a 63 y.o. male, : 1960, MRN: [id number]) demonstrating an affect that was congruent. Oli demonstrating mood that is anxious. Oli had an appearance that was clean. Oli denies suicidal ideation. Oli denies suicide plan. Oli denies hallucinations.      Intervention:    Encourage Oli to perform self-hygiene, grooming, and changing of clothing. Encourage Oli to attend all scheduled groups. Monitor Oli's behavior and program compliance. Monitor Oli for suicidal ideation, homicidal ideation, sleep disturbance, and hallucinations. Encourage Oli to eat all portions of meals and assess for meal preferences. Monitor Oli for intake and output to ensure hydration. Notify the Physician/Physician Assistant/Advance Practice Registered Nurse (MD/PA/APRN) for any medication refusal and any change in patient condition.    Discussed with Oli course of treatment. Discussed with Oli medications ordered and schedule of medications. Discussed collateral contact with Oli.      Response:    Oli's response to treatment team meeting: cooperative. Had complaints of back pain this AM.      Plan:     Continue to monitor per MD/PA/APRN orders; maintain patient safety.

## 2024-05-08 NOTE — PLAN OF CARE
Problem: Depressive Signs/Symptoms  Goal: Optimized Energy Level (Depressive Signs/Symptoms)  Outcome: Progressing  Goal: Optimized Cognitive Function (Depressive Signs/Symptoms)  Outcome: Progressing  Goal: Increased Participation and Engagement (Depressive Signs/Symptoms)  Outcome: Progressing  Goal: Enhanced Self-Esteem and Confidence (Depressive Signs/Symptoms)  Outcome: Progressing  Goal: Improved Mood Symptoms (Depressive Signs/Symptoms)  Outcome: Progressing  Goal: Optimized Nutrition Intake (Depressive Signs/Symptoms)  Outcome: Progressing  Goal: Improved Psychomotor Symptoms (Depressive Signs/Symptoms)  Outcome: Progressing  Goal: Improved Sleep (Depressive Signs/Symptoms)  Outcome: Progressing  Goal: Enhanced Social, Occupational or Functional Skills (Depressive Signs/Symptoms)  Outcome: Progressing     Problem: Excessive Substance Use  Goal: Optimized Energy Level (Excessive Substance Use)  Outcome: Progressing  Goal: Improved Behavioral Control (Excessive Substance Use)  Outcome: Progressing  Goal: Increased Participation and Engagement (Excessive Substance Use)  Outcome: Progressing  Goal: Improved Physiologic Symptoms (Excessive Substance Use)  Outcome: Progressing  Goal: Enhanced Social, Occupational or Functional Skills (Excessive Substance Use)  Outcome: Progressing

## 2024-05-08 NOTE — PROGRESS NOTES
05/08/24 1400   [facility] Group Therapy   Group Name Therapeutic Recreation   Specific Interventions Skilled Activity Leisure Education and Awareness   Participation Level Active;Appropriate;Supportive;Attentive;Sharing   Participation Quality Cooperative;Social   Insight/Motivation Good;Applies New Skills   Affect/Mood Display Appropriate;Bright   Cognition Oriented;Alert   Psychomotor WNL

## 2024-05-08 NOTE — PLAN OF CARE
Oli met reported treatment goals of Get sober and go to long term (rehab).  CTRS Discharge Recommendations:  Encouraged Pt. to actively utilize available community resources to increase leisure involvement to decrease signs and symptoms of illness.  Encouraged Pt. to utilize coping skills on a regular basis to reduce the risk of decomposition and re-hospitalization.

## 2024-05-08 NOTE — PROGRESS NOTES
Aromatherapy and Relaxation/meditation education group Co-facilitated with Nadira Schulte LPN   05/08/24 1500   [facility] Group Therapy   Group Name Therapeutic Recreation   Specific Interventions Relaxation Training   Participation Level Active;Supportive;Appropriate;Attentive;Sharing   Participation Quality Cooperative;Social   Insight/Motivation Good;Applies New Skills   Affect/Mood Display Appropriate;Bright   Cognition Oriented;Alert   Psychomotor WNL

## 2024-05-08 NOTE — CARE UPDATE
Rehab Referral    Rehab referral sent on pt's behalf to   [facility]  for post DC plans.

## 2024-05-08 NOTE — PLAN OF CARE
Oli attends TR groups, is cooperative, interacts well with peers and staff, reporting c/o back pain, progressing towards his treatment goals, has good insight, and attending his ADL's.    Oli attended treatment team, was cooperative, reporting improved mood, reporting c/o of lower back pain, and interest in rehab.

## 2024-05-08 NOTE — NURSING
"PRN Administration Note:    Behavior:    Patient (Oli Sanderson is a 63 y.o. male, : 1960, MRN: [id number])     Allergies: Patient has no known allergies.    Oli's  height is 5' 10" (1.778 m) and weight is 74 kg (163 lb 2.3 oz). His temperature is 98.2 °F (36.8 °C). His blood pressure is 162/95 (abnormal) and his pulse is 71. His respiration is 18 and oxygen saturation is 95%.     Reason for PRN Administration: Complaints of lower extremity pain, PS 9/10._________.    Intervention:    Administered _ Tylenol 650 mg.,______ per physician order to Oli       Response:    Oli tolerated administration well.      Plan:     Continue to monitor per MD/PA/APRN orders; and reevaluate medication effectiveness within 30 minutes.    "

## 2024-05-08 NOTE — PLAN OF CARE
Problem: Depressive Signs/Symptoms  Goal: Optimized Energy Level (Depressive Signs/Symptoms)  Outcome: Progressing  Flowsheets (Taken 5/8/2024 1258)  Mutually Determined Action Steps (Optimized Energy Level):   grooms self without prompting   dresses/ready for morning activity  Intervention: Optimize Energy Level  Flowsheets (Taken 5/8/2024 1258)  Activity (Behavioral Health):   activity encouraged   up ad garth  Patient Performed Hygiene: dressed  Diversional Activity: television  Goal: Optimized Cognitive Function (Depressive Signs/Symptoms)  Outcome: Progressing  Flowsheets (Taken 5/8/2024 1258)  Mutually Determined Action Steps (Optimized Cognitive Function): remains focused during activity  Goal: Increased Participation and Engagement (Depressive Signs/Symptoms)  Outcome: Progressing  Flowsheets (Taken 5/8/2024 1258)  Mutually Determined Action Steps (Increased Participation and Engagement):   participates in one or more activity   initiates interaction with others   voluntarily attends group therapy  Intervention: Facilitate Participation and Engagement  Flowsheets (Taken 5/8/2024 1258)  Supportive Measures:   active listening utilized   positive reinforcement provided   self-reflection promoted   self-care encouraged   self-responsibility promoted   verbalization of feelings encouraged  Diversional Activity: television  Goal: Enhanced Self-Esteem and Confidence (Depressive Signs/Symptoms)  Outcome: Progressing  Intervention: Promote Confidence and Self-Esteem  Flowsheets (Taken 5/8/2024 1258)  Supportive Measures:   active listening utilized   positive reinforcement provided   self-reflection promoted   self-care encouraged   self-responsibility promoted   verbalization of feelings encouraged  Goal: Improved Mood Symptoms (Depressive Signs/Symptoms)  Outcome: Progressing  Intervention: Promote Mood Improvement  Flowsheets (Taken 5/8/2024 1258)  Supportive Measures:   active listening utilized   positive  reinforcement provided   self-reflection promoted   self-care encouraged   self-responsibility promoted   verbalization of feelings encouraged  Diversional Activity: television  Goal: Optimized Nutrition Intake (Depressive Signs/Symptoms)  Outcome: Progressing  Flowsheets (Taken 5/8/2024 1258)  Mutually Determined Action Steps (Optimized Nutrition Intake):   eats at meal time   maintains regular elimination  Goal: Improved Psychomotor Symptoms (Depressive Signs/Symptoms)  Outcome: Progressing  Flowsheets (Taken 5/8/2024 1258)  Mutually Determined Action Steps (Improved Psychomotor Symptoms): adheres to medication regimen  Intervention: Manage Psychomotor Movement  Flowsheets (Taken 5/8/2024 1258)  Activity (Behavioral Health):   activity encouraged   up ad garth  Patient Performed Hygiene: dressed  Diversional Activity: television  Goal: Improved Sleep (Depressive Signs/Symptoms)  Outcome: Progressing  Goal: Enhanced Social, Occupational or Functional Skills (Depressive Signs/Symptoms)  Outcome: Progressing  Intervention: Promote Social, Occupational and Functional Ability  Flowsheets (Taken 5/8/2024 1258)  Social Functional Ability Promotion:   autonomy promoted   self-expression encouraged   social interaction promoted     Problem: Excessive Substance Use  Goal: Optimized Energy Level (Excessive Substance Use)  Outcome: Progressing  Flowsheets (Taken 5/8/2024 1258)  Mutually Determined Action Steps (Optimized Energy Level):   grooms self without prompting   dresses/ready for morning activity  Intervention: Optimize Energy Level  Flowsheets (Taken 5/8/2024 1258)  Activity (Behavioral Health):   activity encouraged   up ad garth  Patient Performed Hygiene: dressed  Diversional Activity: television  Goal: Improved Behavioral Control (Excessive Substance Use)  Outcome: Progressing  Goal: Increased Participation and Engagement (Excessive Substance Use)  Outcome: Progressing  Intervention: Facilitate Participation and  Engagement  Flowsheets (Taken 5/8/2024 1258)  Supportive Measures:   active listening utilized   positive reinforcement provided   self-reflection promoted   self-care encouraged   self-responsibility promoted   verbalization of feelings encouraged  Diversional Activity: television  Goal: Improved Physiologic Symptoms (Excessive Substance Use)  Outcome: Progressing  Intervention: Optimize Physiologic Function  Flowsheets (Taken 5/8/2024 1258)  Oral Nutrition Promotion:   social interaction promoted   physical activity promoted  Goal: Enhanced Social, Occupational or Functional Skills (Excessive Substance Use)  Outcome: Progressing  Intervention: Promote Social, Occupational and Functional Ability  Flowsheets (Taken 5/8/2024 1258)  Trust Relationship/Rapport:   thoughts/feelings acknowledged   empathic listening provided   questions encouraged  Social Functional Ability Promotion:   autonomy promoted   self-expression encouraged   social interaction promoted   Patient is oriented X 4.  Denies SI and HI.  Denies auditory and visual hallucinations.  He is anxious.  Participates in group activities.  Meals eaten in day room.  He is medication compliant.  Educated on uses of medications.  Voices understanding.  Will continue to provide a therapeutic milieu.

## 2024-05-08 NOTE — NURSING
"PRN Medication Follow-up Note:    Behavior:    Patient (Oli Sanderson is a 63 y.o. male, : 1960, MRN: [id number])     Allergies: Patient has no known allergies.    Oli's  height is 5' 10" (1.778 m) and weight is 74 kg (163 lb 2.3 oz). His temperature is 98.2 °F (36.8 °C). His blood pressure is 162/95 (abnormal) and his pulse is 71. His respiration is 18 and oxygen saturation is 95%.     Administered _ Tylenol 650 mg.,______ per physician order to Oli       Intervention:    Intervention to lOi's response: administer medications as ordered. ________.       Response:    Oli's response: _ Patient voices little relief from discomfort. ________      Plan:     Continue to monitor per MD/PA/APRN orders; and reevaluate medication effectiveness within 30 minutes.    "

## 2024-05-08 NOTE — PROGRESS NOTES
"5/8/2024  Oli Sanderson   1960   [id number]        Psychiatry Progress Note     Chief Complaint: "Better"    SUBJECTIVE:   Oli Sanderson is a 63 y.o. male placed under a PEC at [facility] with suicidal ideation with an attempt to OD on sleeping pills.     Attending and participating in groups per staff.  Tolerating current medication regimen without issues.  Blood pressure has been elevated.  Calm and cooperative with treatment team.  No overt behavioral issues reported by staff.  Suicidal ideation improving.  Staff to follow-up with rehab placement.  Medical to evaluate blood pressure and back pain.    UDS: (+)cocaine  Blood alcohol: <10    Current Medications:   Scheduled Meds:    EScitalopram oxalate  20 mg Oral Daily    lisinopriL  10 mg Oral Daily      PRN Meds:   Current Facility-Administered Medications:     acetaminophen, 650 mg, Oral, Q6H PRN    aluminum-magnesium hydroxide-simethicone, 30 mL, Oral, Q6H PRN    cloNIDine, 0.1 mg, Oral, Q8H PRN    cloNIDine, 0.2 mg, Oral, Q8H PRN    haloperidoL, 10 mg, Oral, Q4H PRN **AND** diphenhydrAMINE, 50 mg, Oral, Q4H PRN **AND** LORazepam, 2 mg, Oral, Q4H PRN **AND** haloperidol lactate, 10 mg, Intramuscular, Q4H PRN **AND** diphenhydrAMINE, 50 mg, Intramuscular, Q4H PRN **AND** lorazepam, 2 mg, Intramuscular, Q4H PRN    hydrOXYzine HCL, 50 mg, Oral, Q4H PRN    magnesium hydroxide 400 mg/5 ml, 30 mL, Oral, Daily PRN    ondansetron, 4 mg, Oral, Q6H PRN    traZODone, 100 mg, Oral, Nightly PRN   Psychotherapeutics (From admission, onward)      Start     Stop Route Frequency Ordered    05/07/24 0900  EScitalopram oxalate tablet 20 mg         -- Oral Daily 05/06/24 0948    05/02/24 1131  traZODone tablet 100 mg         -- Oral Nightly PRN 05/02/24 1109    05/02/24 1129  haloperidoL tablet 10 mg  (Med - Acute  Behavioral Management)        Placed in "And" Linked Group    -- Oral Every 4 hours PRN 05/02/24 1109    05/02/24 1129  LORazepam tablet 2 mg  (Med - Acute  Behavioral " VM to pt with request to contact office (mobile # call cannot be completed)    "Management)        Placed in "And" Linked Group    -- Oral Every 4 hours PRN 05/02/24 1109    05/02/24 1129  haloperidol lactate injection 10 mg  (Med - Acute  Behavioral Management)        Placed in "And" Linked Group    -- IM Every 4 hours PRN 05/02/24 1109    05/02/24 1129  LORazepam injection 2 mg  (Med - Acute  Behavioral Management)        Placed in "And" Linked Group    -- IM Every 4 hours PRN 05/02/24 1109            Allergies:   Review of patient's allergies indicates:  No Known Allergies     OBJECTIVE:   Vitals   Vitals:    05/08/24 0701   BP: (!) 162/95   Pulse: 71   Resp: 18   Temp: 98.2 °F (36.8 °C)        Labs/Imaging/Studies:   No results found for this or any previous visit (from the past 36 hour(s)).       Medical Review Of Systems:  Constitutional: negative  Respiratory: negative  Cardiovascular: negative  Gastrointestinal: negative  Genitourinary:negative  Musculoskeletal: (+)back pain  Neurological: negative       Psychiatric Mental Status Exam:  General Appearance: appears stated age, well-developed, well-nourished  Arousal: alert  Behavior: cooperative  Movements and Motor Activity: no abnormal involuntary movements noted  Orientation: oriented to person, place, time, and situation  Speech: normal rate, normal rhythm, normal volume, normal tone  Mood: "Better"  Affect: constricted  Thought Process: linear  Associations: intact  Thought Content and Perceptions: no suicidal ideation, no homicidal ideation, no auditory hallucinations, no visual hallucinations, no paranoid ideation, no ideas of reference  Recent and Remote Memory: recent memory intact, remote memory intact; per interview/observation with patient  Attention and Concentration: intact, attentive to conversation; per interview/observation with patient  Fund of Knowledge: intact, aware of current events, vocabulary appropriate; based on history, vocabulary, fund of knowledge, syntax, grammar, and content  Insight: questionable; based on " understanding of severity of illness and HPI  Judgment: questionable; based on patient's behavior and HPI       ASSESSMENT/PLAN:   Problems Addressed/Diagnoses:  Major Depressive Disorder, recurrent, severe (F33.2)  Alcohol use disorder (F10.10)  Cocaine use disorder (F14.20)     Past Medical History:   Diagnosis Date    Hypertension         Plan:  Depression, chronic with acute exacerbation  -Continue Lexapro 20mg daily    Alcohol use, chronic  -Group/Individual psychotherapy    Cocaine use, chronic with acute exacerbation  -Group/Individual psychotherapy     Expected Disposition Plan:  Rehab?        Boogie Lees M.D.

## 2024-05-08 NOTE — NURSING
"PRN Medication Follow-up Note:    Behavior:    Patient (Oli Sanderson is a 63 y.o. male, : 1960, MRN: [id number])     Allergies: Patient has no known allergies.    Oli's  height is 5' 10" (1.778 m) and weight is 74 kg (163 lb 2.3 oz). His temperature is 98.2 °F (36.8 °C). His blood pressure is 162/95 (abnormal) and his pulse is 71. His respiration is 18 and oxygen saturation is 95%.     Administered Clonidine 0.1 mg.,_______ per physician order to Oli       Intervention:    Intervention to Oli's response: Administer medications as ordered. ________.       Response:    Oli's response: _ /82_______      Plan:     Continue to monitor per MD/PA/APRN orders; and reevaluate medication effectiveness within 30 minutes.    "

## 2024-05-08 NOTE — NURSING
"PRN Administration Note:    Behavior:    Patient (Oli Sanderson is a 63 y.o. male, : 1960, MRN: [id number])     Allergies: Patient has no known allergies.    Oli's  height is 5' 10" (1.778 m) and weight is 74 kg (163 lb 2.3 oz). His temperature is 98.2 °F (36.8 °C). His blood pressure is 162/95 (abnormal) and his pulse is 71. His respiration is 18 and oxygen saturation is 95%.     Reason for PRN Administration: _ /95_________.    Intervention:    Administered Clonidine 0.1 mg.,_____ per physician order to Oli       Response:    Oli tolerated administration well.      Plan:     Continue to monitor per MD/PA/APRN orders; and reevaluate medication effectiveness within 30 minutes.    "

## 2024-05-09 VITALS
SYSTOLIC BLOOD PRESSURE: 165 MMHG | HEIGHT: 70 IN | DIASTOLIC BLOOD PRESSURE: 88 MMHG | WEIGHT: 163.13 LBS | TEMPERATURE: 98 F | BODY MASS INDEX: 23.35 KG/M2 | HEART RATE: 77 BPM | OXYGEN SATURATION: 98 % | RESPIRATION RATE: 18 BRPM

## 2024-05-09 PROCEDURE — 25000003 PHARM REV CODE 250: Performed by: PSYCHIATRY & NEUROLOGY

## 2024-05-09 PROCEDURE — 25000003 PHARM REV CODE 250: Performed by: FAMILY MEDICINE

## 2024-05-09 PROCEDURE — 25000003 PHARM REV CODE 250: Performed by: PEDIATRICS

## 2024-05-09 RX ORDER — CLONIDINE HYDROCHLORIDE 0.1 MG/1
0.1 TABLET ORAL EVERY 8 HOURS PRN
Qty: 30 TABLET | Refills: 0 | Status: SHIPPED | OUTPATIENT
Start: 2024-05-09 | End: 2024-06-08

## 2024-05-09 RX ORDER — POTASSIUM CHLORIDE 20 MEQ/1
20 TABLET, EXTENDED RELEASE ORAL DAILY
Qty: 2 TABLET | Refills: 0 | Status: SHIPPED | OUTPATIENT
Start: 2024-05-10 | End: 2024-05-12

## 2024-05-09 RX ORDER — AMLODIPINE BESYLATE 5 MG/1
5 TABLET ORAL DAILY
Qty: 30 TABLET | Refills: 0 | Status: SHIPPED | OUTPATIENT
Start: 2024-05-10 | End: 2024-06-09

## 2024-05-09 RX ORDER — ESCITALOPRAM OXALATE 20 MG/1
20 TABLET ORAL DAILY
Qty: 30 TABLET | Refills: 0 | Status: SHIPPED | OUTPATIENT
Start: 2024-05-10 | End: 2024-06-09

## 2024-05-09 RX ORDER — LISINOPRIL 10 MG/1
10 TABLET ORAL DAILY
Qty: 30 TABLET | Refills: 0 | Status: SHIPPED | OUTPATIENT
Start: 2024-05-10 | End: 2024-06-09

## 2024-05-09 RX ORDER — CLONIDINE HYDROCHLORIDE 0.1 MG/1
0.2 TABLET ORAL EVERY 8 HOURS PRN
Qty: 30 TABLET | Refills: 0 | Status: SHIPPED | OUTPATIENT
Start: 2024-05-09 | End: 2024-06-08

## 2024-05-09 RX ORDER — METOPROLOL SUCCINATE 25 MG/1
25 TABLET, EXTENDED RELEASE ORAL DAILY
Qty: 30 TABLET | Refills: 0 | Status: SHIPPED | OUTPATIENT
Start: 2024-05-10 | End: 2024-06-09

## 2024-05-09 RX ADMIN — AMLODIPINE BESYLATE 5 MG: 5 TABLET ORAL at 08:05

## 2024-05-09 RX ADMIN — ESCITALOPRAM OXALATE 20 MG: 10 TABLET ORAL at 08:05

## 2024-05-09 RX ADMIN — CLONIDINE HYDROCHLORIDE 0.2 MG: 0.1 TABLET ORAL at 08:05

## 2024-05-09 RX ADMIN — LISINOPRIL 10 MG: 10 TABLET ORAL at 08:05

## 2024-05-09 RX ADMIN — POTASSIUM CHLORIDE 20 MEQ: 1500 TABLET, EXTENDED RELEASE ORAL at 08:05

## 2024-05-09 NOTE — H&P
"Consult Note    Consults  SUBJECTIVE:     History of Present Illness:  Patient is a 63 y.o. male presents with Hypertension. Onset of symptoms was gradual starting 1 year ago with unchanged course since that time. Patient denies symptoms. Symptoms are aggravated by nothing. Symptoms improve with BP medication while on the med. I only took the BP meds while in the hospital and a little while after until he ran out of the first Rx.    Review of patient's allergies indicates:  No Known Allergies  Past Medical History:   Diagnosis Date    Hypertension      History reviewed. No pertinent surgical history.  Family History   Problem Relation Name Age of Onset    Stroke Mother      Hypertension Mother      Breast cancer Mother      Pacemaker/defibrilator Father      Coronary artery disease Father          with stents     Social History     Tobacco Use    Smoking status: Every Day     Current packs/day: 0.10     Types: Cigarettes    Smokeless tobacco: Never   Substance Use Topics    Alcohol use: Yes     Alcohol/week: 6.0 standard drinks of alcohol     Types: 6 Cans of beer per week     Comment: DAILY BEER    Drug use: Yes     Types: Cocaine, "Crack" cocaine     Comment: DAILY     Review of Systems   Constitutional: Negative.    HENT: Negative.     Respiratory: Negative.     Cardiovascular: Negative.    Gastrointestinal: Negative.    Genitourinary: Negative.    Musculoskeletal: Negative.    Skin: Negative.    Neurological: Negative.    Endo/Heme/Allergies: Negative.        OBJECTIVE:     Vital Signs:       Physical Exam  HENT:      Head: Normocephalic.      Mouth/Throat:      Mouth: Mucous membranes are moist.   Eyes:      Pupils: Pupils are equal, round, and reactive to light.   Cardiovascular:      Rate and Rhythm: Normal rate and regular rhythm.   Pulmonary:      Effort: Pulmonary effort is normal.      Breath sounds: Normal breath sounds.   Abdominal:      General: Abdomen is flat. Bowel sounds are normal.      " Palpations: Abdomen is soft.   Musculoskeletal:         General: Normal range of motion.      Cervical back: Neck supple.   Skin:     General: Skin is warm and dry.   Neurological:      General: No focal deficit present.      Mental Status: He is alert.       Laboratory:  No results found for this or any previous visit (from the past 48 hour(s)).        Diagnostic Results:    162/95 Abnormal   as of 5/8/2024 162/95 Abnormal  146/94 Abnormal  186/72 Abnormal          Patient Active Problem List   Diagnosis    HTN (hypertension)    Hypokalemia        ASSESSMENT/PLAN:     Educated and stressed to pt on the importance of BP control long term as this is a risk factor for Stroke, CAD, CHF, . . . .  Family History of some of these.   Start Norvasc 5 mg. Not HCTZ since K = 3.3  K Dur 20meq x 3 dose  Toprol XL 25 mg po x one dose.  BMP Monday  MD to follow up on BP in few days

## 2024-05-09 NOTE — NURSING
Patient was given his discharge instructions and prescriptions and verbalized understanding of them. Patient verbalized that he is ready to go to the rehab and that he is going to follow as per the dr at the next facility.

## 2024-05-09 NOTE — PROGRESS NOTES
"5/9/2024  Oli Sanderson   1960   [id number]        Psychiatry Progress Note     Chief Complaint: "Better"    SUBJECTIVE:   Oli Sanderson is a 63 y.o. male placed under a PEC at [facility] with suicidal ideation with an attempt to OD on sleeping pills.     Today patient states that he is feeling good and ready for rehab.  Attending and participating in groups per staff.  Tolerating current medication regimen without issues.  Blood pressure remains elevated and has been addressed by medical.  Calm and cooperative with treatment team.  No overt behavioral issues reported by staff.  Denies suicidal ideation. Patient has been accepted to [facility]. We will proceed with discharge to rehab today.     UDS: (+)cocaine  Blood alcohol: <10    Current Medications:   Scheduled Meds:    amLODIPine  5 mg Oral Daily    EScitalopram oxalate  20 mg Oral Daily    lisinopriL  10 mg Oral Daily    metoprolol succinate  25 mg Oral Daily    potassium chloride SA  20 mEq Oral Daily      PRN Meds:   Current Facility-Administered Medications:     acetaminophen, 650 mg, Oral, Q6H PRN    aluminum-magnesium hydroxide-simethicone, 30 mL, Oral, Q6H PRN    cloNIDine, 0.1 mg, Oral, Q8H PRN    cloNIDine, 0.2 mg, Oral, Q8H PRN    haloperidoL, 10 mg, Oral, Q4H PRN **AND** diphenhydrAMINE, 50 mg, Oral, Q4H PRN **AND** LORazepam, 2 mg, Oral, Q4H PRN **AND** haloperidol lactate, 10 mg, Intramuscular, Q4H PRN **AND** diphenhydrAMINE, 50 mg, Intramuscular, Q4H PRN **AND** lorazepam, 2 mg, Intramuscular, Q4H PRN    hydrOXYzine HCL, 50 mg, Oral, Q4H PRN    ibuprofen, 400 mg, Oral, Q6H PRN    magnesium hydroxide 400 mg/5 ml, 30 mL, Oral, Daily PRN    ondansetron, 4 mg, Oral, Q6H PRN    traZODone, 100 mg, Oral, Nightly PRN   Psychotherapeutics (From admission, onward)      Start     Stop Route Frequency Ordered    05/07/24 0900  EScitalopram oxalate tablet 20 mg         -- Oral Daily 05/06/24 0948    05/02/24 1131  traZODone tablet 100 mg         -- Oral " "Nightly PRN 05/02/24 1109    05/02/24 1129  haloperidoL tablet 10 mg  (Med - Acute  Behavioral Management)        Placed in "And" Linked Group    -- Oral Every 4 hours PRN 05/02/24 1109    05/02/24 1129  LORazepam tablet 2 mg  (Med - Acute  Behavioral Management)        Placed in "And" Linked Group    -- Oral Every 4 hours PRN 05/02/24 1109    05/02/24 1129  haloperidol lactate injection 10 mg  (Med - Acute  Behavioral Management)        Placed in "And" Linked Group    -- IM Every 4 hours PRN 05/02/24 1109    05/02/24 1129  LORazepam injection 2 mg  (Med - Acute  Behavioral Management)        Placed in "And" Linked Group    -- IM Every 4 hours PRN 05/02/24 1109            Allergies:   Review of patient's allergies indicates:  No Known Allergies     OBJECTIVE:   Vitals   Vitals:    05/08/24 1915   BP: (!) 187/99   Pulse: 78   Resp: 18   Temp: 98.4 °F (36.9 °C)        Labs/Imaging/Studies:   No results found for this or any previous visit (from the past 36 hour(s)).       Medical Review Of Systems:  Constitutional: negative  Respiratory: negative  Cardiovascular: negative  Gastrointestinal: negative  Genitourinary:negative  Musculoskeletal: (+)back pain  Neurological: negative       Psychiatric Mental Status Exam:  General Appearance: appears stated age, well-developed, well-nourished  Arousal: alert  Behavior: cooperative  Movements and Motor Activity: no abnormal involuntary movements noted  Orientation: oriented to person, place, time, and situation  Speech: normal rate, normal rhythm, normal volume, normal tone  Mood: "Better"  Affect: constricted  Thought Process: linear  Associations: intact  Thought Content and Perceptions: no suicidal ideation, no homicidal ideation, no auditory hallucinations, no visual hallucinations, no paranoid ideation, no ideas of reference  Recent and Remote Memory: recent memory intact, remote memory intact; per interview/observation with patient  Attention and Concentration: intact, " attentive to conversation; per interview/observation with patient  Fund of Knowledge: intact, aware of current events, vocabulary appropriate; based on history, vocabulary, fund of knowledge, syntax, grammar, and content  Insight: questionable; based on understanding of severity of illness and HPI  Judgment: questionable; based on patient's behavior and HPI       ASSESSMENT/PLAN:   Problems Addressed/Diagnoses:  Major Depressive Disorder, recurrent, severe (F33.2)  Alcohol use disorder (F10.10)  Cocaine use disorder (F14.20)     Past Medical History:   Diagnosis Date    Hypertension         Plan:  Depression, chronic with acute exacerbation  -Continue Lexapro 20mg daily    Alcohol use, chronic  -Group/Individual psychotherapy    Cocaine use, chronic with acute exacerbation  -Group/Individual psychotherapy     Expected Disposition Plan:  Rehab?        Thaddeus Hernandez, GRADYP-BC

## 2024-05-09 NOTE — PLAN OF CARE
Assessment completed as charted. Patient endorsed anxiety and depression this morning. He denied Si,HI,Sleep issues and AVH. Patient was complaint with medications and the patient was given PRN blood pressure med for his elevated pressure. Will continue to monitor   Problem: Depressive Signs/Symptoms  Goal: Optimized Energy Level (Depressive Signs/Symptoms)  Outcome: Met  Goal: Optimized Cognitive Function (Depressive Signs/Symptoms)  Outcome: Met  Goal: Increased Participation and Engagement (Depressive Signs/Symptoms)  Outcome: Met  Goal: Enhanced Self-Esteem and Confidence (Depressive Signs/Symptoms)  Outcome: Met  Goal: Improved Mood Symptoms (Depressive Signs/Symptoms)  Outcome: Met  Goal: Optimized Nutrition Intake (Depressive Signs/Symptoms)  Outcome: Met  Goal: Improved Psychomotor Symptoms (Depressive Signs/Symptoms)  Outcome: Met  Goal: Improved Sleep (Depressive Signs/Symptoms)  Outcome: Met  Goal: Enhanced Social, Occupational or Functional Skills (Depressive Signs/Symptoms)  Outcome: Met     Problem: Excessive Substance Use  Goal: Optimized Energy Level (Excessive Substance Use)  Outcome: Met  Goal: Improved Behavioral Control (Excessive Substance Use)  Outcome: Met  Goal: Increased Participation and Engagement (Excessive Substance Use)  Outcome: Met  Goal: Improved Physiologic Symptoms (Excessive Substance Use)  Outcome: Met  Goal: Enhanced Social, Occupational or Functional Skills (Excessive Substance Use)  Outcome: Met

## 2024-05-09 NOTE — CARE UPDATE
Pt accepted into [facility].    Transportation has been set up via [facility]. Patient is going to [facility] located at [address] [place] [address]

## 2024-05-09 NOTE — PLAN OF CARE
Problem: Depressive Signs/Symptoms  Goal: Optimized Energy Level (Depressive Signs/Symptoms)  5/8/2024 2154 by Karyn Truong RN  Outcome: Progressing  5/8/2024 2154 by Karyn Truong RN  Outcome: Progressing  Goal: Optimized Cognitive Function (Depressive Signs/Symptoms)  5/8/2024 2154 by Karyn Truong RN  Outcome: Progressing  5/8/2024 2154 by Karyn Truong RN  Outcome: Progressing  Goal: Increased Participation and Engagement (Depressive Signs/Symptoms)  5/8/2024 2154 by Karyn Truong RN  Outcome: Progressing  5/8/2024 2154 by Karyn Truong RN  Outcome: Progressing  Goal: Enhanced Self-Esteem and Confidence (Depressive Signs/Symptoms)  5/8/2024 2154 by Karyn Truong RN  Outcome: Progressing  5/8/2024 2154 by Karyn Truong RN  Outcome: Progressing  Goal: Improved Mood Symptoms (Depressive Signs/Symptoms)  5/8/2024 2154 by Karyn Truong RN  Outcome: Progressing  5/8/2024 2154 by Karyn Truong RN  Outcome: Progressing  Goal: Optimized Nutrition Intake (Depressive Signs/Symptoms)  5/8/2024 2154 by Karyn Truong RN  Outcome: Progressing  5/8/2024 2154 by Karyn Truong RN  Outcome: Progressing  Goal: Improved Psychomotor Symptoms (Depressive Signs/Symptoms)  5/8/2024 2154 by Karyn Truong RN  Outcome: Progressing  5/8/2024 2154 by Karyn Truong RN  Outcome: Progressing  Goal: Improved Sleep (Depressive Signs/Symptoms)  5/8/2024 2154 by Karyn Truong RN  Outcome: Progressing  5/8/2024 2154 by Karyn Truong RN  Outcome: Progressing  Goal: Enhanced Social, Occupational or Functional Skills (Depressive Signs/Symptoms)  5/8/2024 2154 by Karyn Truong RN  Outcome: Progressing  5/8/2024 2154 by Karyn Truong RN  Outcome: Progressing     Problem: Excessive Substance Use  Goal: Optimized Energy Level (Excessive Substance Use)  5/8/2024 2154 by Karyn Truong RN  Outcome: Progressing  5/8/2024 2154 by Karyn Truong RN  Outcome: Progressing  Goal:  Improved Behavioral Control (Excessive Substance Use)  5/8/2024 2154 by Karyn Truong RN  Outcome: Progressing  5/8/2024 2154 by Karyn Truong RN  Outcome: Progressing  Goal: Increased Participation and Engagement (Excessive Substance Use)  5/8/2024 2154 by Karyn Truong RN  Outcome: Progressing  5/8/2024 2154 by Karyn Truong RN  Outcome: Progressing  Goal: Improved Physiologic Symptoms (Excessive Substance Use)  5/8/2024 2154 by Karyn Truong RN  Outcome: Progressing  5/8/2024 2154 by Karyn Truong RN  Outcome: Progressing  Goal: Enhanced Social, Occupational or Functional Skills (Excessive Substance Use)  5/8/2024 2154 by Karyn Truong RN  Outcome: Progressing  5/8/2024 2154 by Karyn Truong RN  Outcome: Progressing   Pt with flat affect, depressed and anxious mood. Denies SI. Pt is pleasant, stays to self and is med compliant. Pt denies SI and A/V/H. Encouraged positive behavior and med compliance. Pt verbalized understanding. SP active, Q 15 min obs active, safety maintained    2013 trazodone 100 mg po, ibuprofen 400 mg, effective @ 2100 2109 mylanta 30 mls po given pt tolerated well effective @ 2200

## 2024-05-09 NOTE — PROGRESS NOTES
05/09/24 1000   [facility] Group Therapy   Group Name Therapeutic Recreation   Specific Interventions Skilled Activity Mild Exercises   Participation Level   (excused)   Participation Quality Conflict w/ Other;Services

## 2024-05-13 PROBLEM — F10.10 ALCOHOL ABUSE, CONTINUOUS: Status: ACTIVE | Noted: 2024-05-13

## 2024-05-13 PROBLEM — F33.2 MAJOR DEPRESSIVE DISORDER, RECURRENT, SEVERE WITHOUT PSYCHOTIC FEATURES: Status: ACTIVE | Noted: 2024-05-13

## 2024-05-13 PROBLEM — F14.20 COCAINE DEPENDENCE, CONTINUOUS: Status: ACTIVE | Noted: 2024-05-13

## 2024-05-13 NOTE — DISCHARGE SUMMARY
DISCHARGE SUMMARY  PSYCHIATRY      Admit Date: 5/2/2024 11:02 AM    Discharge Date:  5/9/2024    SITE:   [facility]    Discharge Attending Physician: Boogie Lees M.D.    Chief Complaint:  Depressed    History of Present Illness On Admit:   Oli Sanderson is a 63 y.o. male placed under a P[facility] at [facility] with suicidal ideation with an attempt to OD on sleeping pills.      States that he was smoking crack for a couple days fairly heavy. He has been using for a couple of years he also states that he drinks approximately a 6 pack per day. Patient was inpatient here back in 2021 and discharged home with Lexapro. He stayed on this for a couple of months but stopped when he felt he no longer needed it. He states that he lost his oldest son since then and this led to him starting to drink again which led to him using cocaine. He states that over time his mood became lower and lower. He endorsed that the Lexapro did help his mood when he was taking it so we will restart this. He denies any SI today but does endorse continued depression. He is amenable to going inpatient for rehab so I will follow up with staff for placement. Will admit for medication management and safety monitoring.       Admit Mental Status Exam:  General Appearance: appears stated age, well developed and nourished, adequately groomed and appropriately dressed, in no acute distress  Arousal: alert with clear sensorium  Behavior: normal; cooperative; reasonably friendly, pleasant, and polite; appropriate eye-contact; under good behavioral control  Movements and Motor Activity: no abnormal involuntary movements noted; no tics, no tremors, no akathisia, no dystonia, no evidence of tardive dyskinesia; no psychomotor agitation or retardation  Orientation: intact; oriented fully to person, place, time and situation  Speech: intact; normal rate, rhythm, volume, tone and pitch; conversational, spontaneous, and  coherent  Mood: Depressed  Affect: constricted  Thought Process: intact, linear, goal-directed, organized, logical  Associations: intact, no loosening of associations  Thought Content and Perceptions: recent suicidal ideation, no homicidal ideation, no auditory or visual hallucinations, no paranoid ideation, no ideas of reference, no evidence of delusions or psychosis  Recent and Remote Memory: grossly intact, able to recall relevant and salient information from the recent and remote past; per interview/observation with patient  Attention and Concentration: intact; per interview/observation with patient  Fund of Knowledge: intact; based on history, vocabulary, fund of knowledge, syntax, grammar, and content  Insight: intact; based on understanding of severity of illness and HPI  Judgment: intact; based on patient's behavior and HPI      Diagnoses:  PRINCIPAL PROBLEM:  Major depressive disorder, recurrent, severe without psychotic features      PROBLEM LIST    Major depressive disorder, recurrent, severe without psychotic features    HTN (hypertension)    Hypokalemia    Alcohol abuse, continuous    Cocaine dependence, continuous        Hospital Course:   Patient was admitted to [facility] and started on Lexapro 10mg daily.    5/6/24  Today, he states that he is interested in going to rehab.  Staff currently working on options as he does not currently have insurance.  Tolerating current medication regimen without issues.  Suicidal ideation improving.  Constricted affect.  Poor home compliance with treatment and was on no psychiatric medication prior to his arrival.  Will increase Lexapro and will follow-up with discharge planning.       UDS: (+)cocaine  Blood alcohol: <10      5/7/24  Today, patient states that he is feeling better. Tolerating current medication regimen without issues. Suicidal ideation improving.  Constricted affect.  Eating well. States that he is not sleeping well but this appears to be due to the  "chronic back pain. Will continue current POC and will follow-up with discharge planning.        5/8/24  Attending and participating in groups per staff.  Tolerating current medication regimen without issues.  Blood pressure has been elevated.  Calm and cooperative with treatment team.  No overt behavioral issues reported by staff.  Suicidal ideation improving.  Staff to follow-up with rehab placement.  Medical to evaluate blood pressure and back pain.       5/9/24  Today patient states that he is feeling good and ready for rehab.  Attending and participating in groups per staff.  Tolerating current medication regimen without issues.  Blood pressure remains elevated and has been addressed by medical.  Calm and cooperative with treatment team.  No overt behavioral issues reported by staff.  Denies suicidal ideation. Patient has been accepted to [facility]. We will proceed with discharge to rehab today.         Current Medications:   Scheduled Meds:        DISCHARGE EXAMINATION    VITALS   Vitals:    05/08/24 0701 05/08/24 1915 05/09/24 0701 05/09/24 0847   BP: (!) 162/95 (!) 187/99 (!) 185/98 (!) 165/88   BP Location:  Left arm     Patient Position:  Sitting     Pulse: 71 78 77    Resp: 18 18 18    Temp: 98.2 °F (36.8 °C) 98.4 °F (36.9 °C) 98.2 °F (36.8 °C)    TempSrc:  Oral     SpO2: 95% 99% 98%    Weight:       Height:             Discharge Mental Status Exam:  General Appearance: appears stated age, well-developed, well-nourished  Arousal: alert  Behavior: cooperative  Movements and Motor Activity: no abnormal involuntary movements noted  Orientation: oriented to person, place, time, and situation  Speech: normal rate, normal rhythm, normal volume, normal tone  Mood: "Better"  Affect: constricted  Thought Process: linear  Associations: intact  Thought Content and Perceptions: no suicidal ideation, no homicidal ideation, no auditory hallucinations, no visual hallucinations, no paranoid ideation, no ideas of " reference  Recent and Remote Memory: recent memory intact, remote memory intact; per interview/observation with patient  Attention and Concentration: intact, attentive to conversation; per interview/observation with patient  Fund of Knowledge: intact, aware of current events, vocabulary appropriate; based on history, vocabulary, fund of knowledge, syntax, grammar, and content  Insight: questionable; based on understanding of severity of illness and HPI  Judgment: questionable; based on patient's behavior and HPI       Discharge Condition:  Stable    Prognosis:  Fair    Justification for multiple antipsychotics:  N/a    Disposition:  discharged to rehab    Follow-up:     Follow-up Information       [facility] Follow up.    Contact information:  TAMMIE Conner  [phone]-fax                           Medication Regimen:  No current facility-administered medications for this encounter.    Current Outpatient Medications:     amLODIPine (NORVASC) 5 MG tablet, Take 1 tablet (5 mg total) by mouth once daily., Disp: 30 tablet, Rfl: 0    cloNIDine (CATAPRES) 0.1 MG tablet, Take 1 tablet (0.1 mg total) by mouth every 8 (eight) hours as needed (160/90)., Disp: 30 tablet, Rfl: 0    cloNIDine (CATAPRES) 0.1 MG tablet, Take 2 tablets (0.2 mg total) by mouth every 8 (eight) hours as needed (180/100)., Disp: 30 tablet, Rfl: 0    EScitalopram oxalate (LEXAPRO) 20 MG tablet, Take 1 tablet (20 mg total) by mouth once daily., Disp: 30 tablet, Rfl: 0    lisinopriL 10 MG tablet, Take 1 tablet (10 mg total) by mouth once daily., Disp: 30 tablet, Rfl: 0    metoprolol succinate (TOPROL-XL) 25 MG 24 hr tablet, Take 1 tablet (25 mg total) by mouth once daily., Disp: 30 tablet, Rfl: 0      Patient Instructions:   Continue medication regimen as prescribed.    Disposition plan per  - see  notes for details.    Patient instructed to call 911 or present to emergency department if  any of the following complications develop status post discharge: suicidality, homicidality, or grave disability.     Total time spent discharging patient: <30 minutes      Boogie Lees M.D.

## 2024-11-08 ENCOUNTER — HOSPITAL ENCOUNTER (EMERGENCY)
Facility: HOSPITAL | Age: 64
Discharge: PSYCHIATRIC OR SUBSTANCE USE FACILITY | End: 2024-11-08
Attending: EMERGENCY MEDICINE
Payer: MEDICAID

## 2024-11-08 VITALS
HEART RATE: 89 BPM | DIASTOLIC BLOOD PRESSURE: 95 MMHG | WEIGHT: 171.94 LBS | OXYGEN SATURATION: 98 % | SYSTOLIC BLOOD PRESSURE: 165 MMHG | BODY MASS INDEX: 24.67 KG/M2 | TEMPERATURE: 98 F | RESPIRATION RATE: 17 BRPM

## 2024-11-08 DIAGNOSIS — F19.10 DRUG ABUSE: Primary | ICD-10-CM

## 2024-11-08 LAB
AMPHET UR QL SCN: NEGATIVE
BACTERIA #/AREA URNS AUTO: ABNORMAL /HPF
BARBITURATE SCN PRESENT UR: NEGATIVE
BENZODIAZ UR QL SCN: NEGATIVE
BILIRUB UR QL STRIP.AUTO: NEGATIVE
CANNABINOIDS UR QL SCN: NEGATIVE
CLARITY UR: CLEAR
COCAINE UR QL SCN: POSITIVE
COLOR UR AUTO: YELLOW
ETHANOL SERPL-MCNC: <10 MG/DL
FENTANYL UR QL SCN: NEGATIVE
GLUCOSE UR QL STRIP: NORMAL
HGB UR QL STRIP: ABNORMAL
HOLD SPECIMEN: NORMAL
HYALINE CASTS #/AREA URNS LPF: ABNORMAL /LPF
KETONES UR QL STRIP: ABNORMAL
LEUKOCYTE ESTERASE UR QL STRIP: NEGATIVE
MDMA UR QL SCN: NEGATIVE
MUCOUS THREADS URNS QL MICRO: ABNORMAL /LPF
NITRITE UR QL STRIP: NEGATIVE
OPIATES UR QL SCN: NEGATIVE
PCP UR QL: NEGATIVE
PH UR STRIP: 6 [PH]
PH UR: 6 [PH] (ref 3–11)
PROT UR QL STRIP: ABNORMAL
RBC #/AREA URNS AUTO: ABNORMAL /HPF
SP GR UR STRIP.AUTO: 1.03 (ref 1–1.03)
SPECIFIC GRAVITY, URINE AUTO (.000) (OHS): 1.03 (ref 1–1.03)
SQUAMOUS #/AREA URNS LPF: ABNORMAL /HPF
UROBILINOGEN UR STRIP-ACNC: NORMAL
WBC #/AREA URNS AUTO: ABNORMAL /HPF

## 2024-11-08 PROCEDURE — 99283 EMERGENCY DEPT VISIT LOW MDM: CPT

## 2024-11-08 PROCEDURE — 80307 DRUG TEST PRSMV CHEM ANLYZR: CPT | Performed by: EMERGENCY MEDICINE

## 2024-11-08 PROCEDURE — 82077 ASSAY SPEC XCP UR&BREATH IA: CPT | Performed by: EMERGENCY MEDICINE

## 2024-11-08 PROCEDURE — 81001 URINALYSIS AUTO W/SCOPE: CPT | Performed by: EMERGENCY MEDICINE

## 2024-11-08 NOTE — ED PROVIDER NOTES
"Encounter Date: 11/8/2024       History     Chief Complaint   Patient presents with    Psychiatric Evaluation     Pt w report of SI, NO PLAN X 4 DAYS.  DENIES HI, -AH-VH.  PT WANDED.      Patient is here requesting links to rehabilitative services for polysubstance abuse.  Patient is denying suicidal ideation.  No features found concerning for decompensated psychosis of the present time.  Patient is without somatic complaints.        Review of patient's allergies indicates:  No Known Allergies  Past Medical History:   Diagnosis Date    Hypertension      History reviewed. No pertinent surgical history.  Family History   Problem Relation Name Age of Onset    Stroke Mother      Hypertension Mother      Breast cancer Mother      Pacemaker/defibrilator Father      Coronary artery disease Father          with stents     Social History     Tobacco Use    Smoking status: Every Day     Current packs/day: 0.10     Average packs/day: 0.1 packs/day for 44.9 years (4.5 ttl pk-yrs)     Types: Cigarettes     Start date: 1980    Smokeless tobacco: Never   Substance Use Topics    Alcohol use: Yes     Alcohol/week: 6.0 standard drinks of alcohol     Types: 6 Cans of beer per week     Comment: DAILY BEER    Drug use: Yes     Types: Cocaine, "Crack" cocaine     Comment: DAILY     Review of Systems    Physical Exam     Initial Vitals [11/08/24 1044]   BP Pulse Resp Temp SpO2   (!) 175/103 88 16 97.9 °F (36.6 °C) 98 %      MAP       --         Physical Exam    Nursing note and vitals reviewed.  Constitutional: He appears well-developed and well-nourished. He is not diaphoretic. No distress.   HENT:   Head: Normocephalic and atraumatic.   Eyes: EOM are normal. Pupils are equal, round, and reactive to light. Right eye exhibits no discharge. Left eye exhibits no discharge.   Neck: Neck supple. No thyromegaly present. No tracheal deviation present. No JVD present.   Normal range of motion.  Cardiovascular:  Normal rate, regular rhythm, " normal heart sounds and intact distal pulses.           No murmur heard.  Pulmonary/Chest: Breath sounds normal. No stridor. No respiratory distress. He has no wheezes. He has no rhonchi. He has no rales.   Abdominal: Abdomen is soft. He exhibits no distension. There is no abdominal tenderness. There is no rebound and no guarding.   Musculoskeletal:         General: No tenderness or edema. Normal range of motion.      Cervical back: Normal range of motion and neck supple.     Neurological: He is alert and oriented to person, place, and time. He has normal strength. No cranial nerve deficit. GCS score is 15. GCS eye subscore is 4. GCS verbal subscore is 5. GCS motor subscore is 6.   Skin: Skin is warm and dry. Capillary refill takes less than 2 seconds. No rash and no abscess noted. No erythema. No pallor.   Psychiatric: He has a normal mood and affect. His behavior is normal. Judgment and thought content normal.         ED Course   Procedures  Labs Reviewed   URINALYSIS, REFLEX TO URINE CULTURE - Abnormal       Result Value    Color, UA Yellow      Appearance, UA Clear      Specific Gravity, UA 1.028      pH, UA 6.0      Protein, UA Trace (*)     Glucose, UA Normal      Ketones, UA Trace (*)     Blood, UA 2+ (*)     Bilirubin, UA Negative      Urobilinogen, UA Normal      Nitrites, UA Negative      Leukocyte Esterase, UA Negative      RBC, UA 11-20 (*)     WBC, UA 0-5      Bacteria, UA None Seen      Squamous Epithelial Cells, UA Trace (*)     Mucous, UA Many (*)     Hyaline Casts, UA None Seen     DRUG SCREEN, URINE (BEAKER) - Abnormal    Amphetamines, Urine Negative      Barbiturates, Urine Negative      Benzodiazepine, Urine Negative      Cannabinoids, Urine Negative      Cocaine, Urine Positive (*)     Fentanyl, Urine Negative      MDMA, Urine Negative      Opiates, Urine Negative      Phencyclidine, Urine Negative      pH, Urine 6.0      Specific Gravity, Urine Auto 1.028      Narrative:     Cut off  concentrations:    Amphetamines - 1000 ng/ml  Barbiturates - 200 ng/ml  Benzodiazepine - 200 ng/ml  Cannabinoids (THC) - 50 ng/ml  Cocaine - 300 ng/ml  Fentanyl - 1.0 ng/ml  MDMA - 500 ng/ml  Opiates - 300 ng/ml   Phencyclidine (PCP) - 25 ng/ml    Specimen submitted for drug analysis and tested for pH and specific gravity in order to evaluate sample integrity. Suspect tampering if specific gravity is <1.003 and/or pH is not within the range of 4.5 - 8.0  False negatives may result form substances such as bleach added to urine.  False positives may result for the presence of a substance with similar chemical structure to the drug or its metabolite.    This test provides only a PRELIMINARY analytical test result. A more specific alternate chemical method must be used in order to obtain a confirmed analytical result. Gas chromatography/mass spectrometry (GC/MS) is the preferred confirmatory method. Other chemical confirmation methods are available. Clinical consideration and professional judgement should be applied to any drug of abuse test result, particularly when preliminary positive results are used.    Positive results will be confirmed only at the physicians request. Unconfirmed screening results are to be used only for medical purposes (treatment).        ALCOHOL,MEDICAL (ETHANOL) - Normal    Ethanol Level <10.0     EXTRA TUBES    Narrative:     The following orders were created for panel order EXTRA TUBES.  Procedure                               Abnormality         Status                     ---------                               -----------         ------                     Lavender Top Hold[[id number]]                               Final result                 Please view results for these tests on the individual orders.   LAVENDER TOP HOLD    Extra Tube Hold for add-ons.            Imaging Results    None          Medications - No data to display  Medical Decision Making  Amount and/or Complexity of Data  Reviewed  Labs: ordered. Decision-making details documented in ED Course.     Details: As above;  Discussion of management or test interpretation with external provider(s): [facility] team has evaluated the case, establish links for drug abuse rehabilitative services;               ED Course as of 11/08/24 1430   Fri Nov 08, 2024   1302 Urine toxicology positive for cocaine; [CT]   1302 Contaminated urinalysis, unconvincing as UTI; [CT]      ED Course User Index  [CT] Sean Carvajal MD                           Clinical Impression:  Final diagnoses:  [F19.10] Drug abuse (Primary)          ED Disposition Condition    Discharge Stable          ED Prescriptions    None       Follow-up Information    None          Sean Carvajal MD  11/08/24 1430

## 2024-12-13 ENCOUNTER — HOSPITAL ENCOUNTER (EMERGENCY)
Facility: HOSPITAL | Age: 64
Discharge: PSYCHIATRIC OR SUBSTANCE USE FACILITY | End: 2024-12-13
Attending: INTERNAL MEDICINE
Payer: MEDICAID

## 2024-12-13 ENCOUNTER — HOSPITAL ENCOUNTER (INPATIENT)
Facility: HOSPITAL | Age: 64
LOS: 7 days | Discharge: HOME | DRG: 885 | End: 2024-12-20
Attending: PSYCHIATRY & NEUROLOGY | Admitting: PSYCHIATRY & NEUROLOGY
Payer: MEDICAID

## 2024-12-13 VITALS
HEART RATE: 99 BPM | DIASTOLIC BLOOD PRESSURE: 72 MMHG | BODY MASS INDEX: 26.36 KG/M2 | OXYGEN SATURATION: 100 % | RESPIRATION RATE: 13 BRPM | SYSTOLIC BLOOD PRESSURE: 112 MMHG | TEMPERATURE: 98 F | WEIGHT: 178 LBS | HEIGHT: 69 IN

## 2024-12-13 DIAGNOSIS — Z00.8 MEDICAL CLEARANCE FOR PSYCHIATRIC ADMISSION: ICD-10-CM

## 2024-12-13 DIAGNOSIS — F19.10 POLYSUBSTANCE ABUSE: ICD-10-CM

## 2024-12-13 DIAGNOSIS — F32.A DEPRESSION: ICD-10-CM

## 2024-12-13 DIAGNOSIS — T14.91XA SUICIDAL BEHAVIOR WITH ATTEMPTED SELF-INJURY: Primary | ICD-10-CM

## 2024-12-13 LAB
ALBUMIN SERPL-MCNC: 4 G/DL (ref 3.4–4.8)
ALBUMIN SERPL-MCNC: 4.3 G/DL (ref 3.4–4.8)
ALBUMIN/GLOB SERPL: 1.2 RATIO (ref 1.1–2)
ALBUMIN/GLOB SERPL: 1.3 RATIO (ref 1.1–2)
ALLENS TEST BLOOD GAS (OHS): ABNORMAL
ALP SERPL-CCNC: 82 UNIT/L (ref 40–150)
ALP SERPL-CCNC: 90 UNIT/L (ref 40–150)
ALT SERPL-CCNC: 19 UNIT/L (ref 0–55)
ALT SERPL-CCNC: 20 UNIT/L (ref 0–55)
AMPHET UR QL SCN: NEGATIVE
ANION GAP SERPL CALC-SCNC: 10 MEQ/L
ANION GAP SERPL CALC-SCNC: 7 MEQ/L
APAP SERPL-MCNC: <3 UG/ML (ref 10–30)
APAP SERPL-MCNC: <3 UG/ML (ref 10–30)
AST SERPL-CCNC: 17 UNIT/L (ref 5–34)
AST SERPL-CCNC: 18 UNIT/L (ref 5–34)
BACTERIA #/AREA URNS AUTO: ABNORMAL /HPF
BARBITURATE SCN PRESENT UR: NEGATIVE
BASE EXCESS BLD CALC-SCNC: -0.2 MMOL/L
BASOPHILS # BLD AUTO: 0.03 X10(3)/MCL
BASOPHILS NFR BLD AUTO: 0.4 %
BENZODIAZ UR QL SCN: NEGATIVE
BILIRUB SERPL-MCNC: 0.5 MG/DL
BILIRUB SERPL-MCNC: 0.6 MG/DL
BILIRUB UR QL STRIP.AUTO: NEGATIVE
BLOOD GAS SAMPLE TYPE (OHS): ABNORMAL
BUN SERPL-MCNC: 26.9 MG/DL (ref 8.4–25.7)
BUN SERPL-MCNC: 27.1 MG/DL (ref 8.4–25.7)
CALCIUM SERPL-MCNC: 10 MG/DL (ref 8.8–10)
CALCIUM SERPL-MCNC: 9.4 MG/DL (ref 8.8–10)
CANNABINOIDS UR QL SCN: NEGATIVE
CHLORIDE SERPL-SCNC: 104 MMOL/L (ref 98–107)
CHLORIDE SERPL-SCNC: 104 MMOL/L (ref 98–107)
CLARITY UR: CLEAR
CO2 BLDA-SCNC: 25.1 MMOL/L
CO2 SERPL-SCNC: 26 MMOL/L (ref 23–31)
CO2 SERPL-SCNC: 28 MMOL/L (ref 23–31)
COCAINE UR QL SCN: POSITIVE
COHGB MFR BLDA: 2.6 %
COLOR UR AUTO: YELLOW
CREAT SERPL-MCNC: 0.99 MG/DL (ref 0.72–1.25)
CREAT SERPL-MCNC: 1.08 MG/DL (ref 0.72–1.25)
CREAT/UREA NIT SERPL: 25
CREAT/UREA NIT SERPL: 27
DRAWN BY BLOOD GAS (OHS): ABNORMAL
EOSINOPHIL # BLD AUTO: 0.14 X10(3)/MCL (ref 0–0.9)
EOSINOPHIL NFR BLD AUTO: 1.7 %
ERYTHROCYTE [DISTWIDTH] IN BLOOD BY AUTOMATED COUNT: 12.9 % (ref 11.5–17)
ETHANOL SERPL-MCNC: <10 MG/DL
FENTANYL UR QL SCN: NEGATIVE
GFR SERPLBLD CREATININE-BSD FMLA CKD-EPI: >60 ML/MIN/1.73/M2
GFR SERPLBLD CREATININE-BSD FMLA CKD-EPI: >60 ML/MIN/1.73/M2
GLOBULIN SER-MCNC: 3.2 GM/DL (ref 2.4–3.5)
GLOBULIN SER-MCNC: 3.5 GM/DL (ref 2.4–3.5)
GLUCOSE SERPL-MCNC: 85 MG/DL (ref 82–115)
GLUCOSE SERPL-MCNC: 91 MG/DL (ref 82–115)
GLUCOSE UR QL STRIP: NORMAL
HCO3 BLDA-SCNC: 24 MMOL/L
HCT VFR BLD AUTO: 45.7 % (ref 42–52)
HGB BLD-MCNC: 15.7 G/DL (ref 14–18)
HGB UR QL STRIP: ABNORMAL
HOLD SPECIMEN: NORMAL
HYALINE CASTS #/AREA URNS LPF: ABNORMAL /LPF
IMM GRANULOCYTES # BLD AUTO: 0.03 X10(3)/MCL (ref 0–0.04)
IMM GRANULOCYTES NFR BLD AUTO: 0.4 %
INHALED O2 CONCENTRATION: 21 %
INR PPP: 1
KETONES UR QL STRIP: ABNORMAL
LEUKOCYTE ESTERASE UR QL STRIP: NEGATIVE
LYMPHOCYTES # BLD AUTO: 2.79 X10(3)/MCL (ref 0.6–4.6)
LYMPHOCYTES NFR BLD AUTO: 32.9 %
MAGNESIUM SERPL-MCNC: 2.3 MG/DL (ref 1.6–2.6)
MCH RBC QN AUTO: 31.2 PG (ref 27–31)
MCHC RBC AUTO-ENTMCNC: 34.4 G/DL (ref 33–36)
MCV RBC AUTO: 90.7 FL (ref 80–94)
MDMA UR QL SCN: NEGATIVE
METHGB MFR BLDA: 0.5 %
MONOCYTES # BLD AUTO: 0.9 X10(3)/MCL (ref 0.1–1.3)
MONOCYTES NFR BLD AUTO: 10.6 %
MUCOUS THREADS URNS QL MICRO: ABNORMAL /LPF
NEUTROPHILS # BLD AUTO: 4.58 X10(3)/MCL (ref 2.1–9.2)
NEUTROPHILS NFR BLD AUTO: 54 %
NITRITE UR QL STRIP: NEGATIVE
NRBC BLD AUTO-RTO: 0 %
O2 HB BLOOD GAS (OHS): 90.2 %
OPIATES UR QL SCN: NEGATIVE
OXYHGB MFR BLDA: 15.2 G/DL
PCO2 BLDA: 37 MMHG (ref 40–50)
PCP UR QL: NEGATIVE
PH BLDA: 7.42 [PH] (ref 7.3–7.4)
PH UR STRIP: 5.5 [PH]
PH UR: 5.5 [PH] (ref 3–11)
PHOSPHATE SERPL-MCNC: 3.7 MG/DL (ref 2.3–4.7)
PLATELET # BLD AUTO: 350 X10(3)/MCL (ref 130–400)
PMV BLD AUTO: 9.2 FL (ref 7.4–10.4)
PO2 BLDA: 61 MMHG (ref 30–40)
POTASSIUM SERPL-SCNC: 3.2 MMOL/L (ref 3.5–5.1)
POTASSIUM SERPL-SCNC: 3.9 MMOL/L (ref 3.5–5.1)
PROT SERPL-MCNC: 7.2 GM/DL (ref 5.8–7.6)
PROT SERPL-MCNC: 7.8 GM/DL (ref 5.8–7.6)
PROT UR QL STRIP: ABNORMAL
PROTHROMBIN TIME: 13.2 SECONDS (ref 11.4–14)
RBC # BLD AUTO: 5.04 X10(6)/MCL (ref 4.7–6.1)
RBC #/AREA URNS AUTO: ABNORMAL /HPF
SALICYLATES SERPL-MCNC: <5 MG/DL (ref 15–30)
SALICYLATES SERPL-MCNC: <5 MG/DL (ref 15–30)
SAO2 % BLDA: 93.1 %
SODIUM SERPL-SCNC: 139 MMOL/L (ref 136–145)
SODIUM SERPL-SCNC: 140 MMOL/L (ref 136–145)
SP GR UR STRIP.AUTO: 1.03 (ref 1–1.03)
SPECIFIC GRAVITY, URINE AUTO (.000) (OHS): 1.03 (ref 1–1.03)
SQUAMOUS #/AREA URNS LPF: ABNORMAL /HPF
TSH SERPL-ACNC: 2.24 UIU/ML (ref 0.35–4.94)
UROBILINOGEN UR STRIP-ACNC: NORMAL
WBC # BLD AUTO: 8.47 X10(3)/MCL (ref 4.5–11.5)
WBC #/AREA URNS AUTO: ABNORMAL /HPF

## 2024-12-13 PROCEDURE — 85610 PROTHROMBIN TIME: CPT | Performed by: INTERNAL MEDICINE

## 2024-12-13 PROCEDURE — 83735 ASSAY OF MAGNESIUM: CPT | Performed by: INTERNAL MEDICINE

## 2024-12-13 PROCEDURE — 82077 ASSAY SPEC XCP UR&BREATH IA: CPT | Performed by: INTERNAL MEDICINE

## 2024-12-13 PROCEDURE — 80179 DRUG ASSAY SALICYLATE: CPT | Performed by: INTERNAL MEDICINE

## 2024-12-13 PROCEDURE — 99900035 HC TECH TIME PER 15 MIN (STAT)

## 2024-12-13 PROCEDURE — 84100 ASSAY OF PHOSPHORUS: CPT | Performed by: INTERNAL MEDICINE

## 2024-12-13 PROCEDURE — 80053 COMPREHEN METABOLIC PANEL: CPT | Performed by: INTERNAL MEDICINE

## 2024-12-13 PROCEDURE — 80143 DRUG ASSAY ACETAMINOPHEN: CPT | Performed by: INTERNAL MEDICINE

## 2024-12-13 PROCEDURE — 84443 ASSAY THYROID STIM HORMONE: CPT | Performed by: INTERNAL MEDICINE

## 2024-12-13 PROCEDURE — 25000003 PHARM REV CODE 250: Performed by: PSYCHIATRY & NEUROLOGY

## 2024-12-13 PROCEDURE — 81001 URINALYSIS AUTO W/SCOPE: CPT | Performed by: INTERNAL MEDICINE

## 2024-12-13 PROCEDURE — 25000003 PHARM REV CODE 250: Performed by: INTERNAL MEDICINE

## 2024-12-13 PROCEDURE — 99285 EMERGENCY DEPT VISIT HI MDM: CPT | Mod: 25

## 2024-12-13 PROCEDURE — 85025 COMPLETE CBC W/AUTO DIFF WBC: CPT | Performed by: INTERNAL MEDICINE

## 2024-12-13 PROCEDURE — 11400000 HC PSYCH PRIVATE ROOM

## 2024-12-13 PROCEDURE — 80307 DRUG TEST PRSMV CHEM ANLYZR: CPT | Performed by: INTERNAL MEDICINE

## 2024-12-13 PROCEDURE — 93005 ELECTROCARDIOGRAM TRACING: CPT

## 2024-12-13 RX ORDER — IBUPROFEN 200 MG
1 TABLET ORAL DAILY
Status: DISCONTINUED | OUTPATIENT
Start: 2024-12-13 | End: 2024-12-16

## 2024-12-13 RX ORDER — HYDROXYZINE HYDROCHLORIDE 50 MG/1
50 TABLET, FILM COATED ORAL EVERY 4 HOURS PRN
Status: DISCONTINUED | OUTPATIENT
Start: 2024-12-13 | End: 2024-12-20 | Stop reason: HOSPADM

## 2024-12-13 RX ORDER — IBUPROFEN 200 MG
1 TABLET ORAL DAILY
Status: DISCONTINUED | OUTPATIENT
Start: 2024-12-13 | End: 2024-12-13 | Stop reason: HOSPADM

## 2024-12-13 RX ORDER — HALOPERIDOL 5 MG/1
10 TABLET ORAL EVERY 4 HOURS PRN
Status: DISCONTINUED | OUTPATIENT
Start: 2024-12-13 | End: 2024-12-20 | Stop reason: HOSPADM

## 2024-12-13 RX ORDER — DIPHENHYDRAMINE HCL 50 MG
50 CAPSULE ORAL EVERY 4 HOURS PRN
Status: DISCONTINUED | OUTPATIENT
Start: 2024-12-13 | End: 2024-12-20 | Stop reason: HOSPADM

## 2024-12-13 RX ORDER — ALUMINUM HYDROXIDE, MAGNESIUM HYDROXIDE, AND SIMETHICONE 1200; 120; 1200 MG/30ML; MG/30ML; MG/30ML
30 SUSPENSION ORAL EVERY 6 HOURS PRN
Status: DISCONTINUED | OUTPATIENT
Start: 2024-12-13 | End: 2024-12-20 | Stop reason: HOSPADM

## 2024-12-13 RX ORDER — LORAZEPAM 2 MG/ML
2 INJECTION INTRAMUSCULAR EVERY 4 HOURS PRN
Status: DISCONTINUED | OUTPATIENT
Start: 2024-12-13 | End: 2024-12-20 | Stop reason: HOSPADM

## 2024-12-13 RX ORDER — ACETAMINOPHEN 325 MG/1
650 TABLET ORAL EVERY 6 HOURS PRN
Status: DISCONTINUED | OUTPATIENT
Start: 2024-12-13 | End: 2024-12-20 | Stop reason: HOSPADM

## 2024-12-13 RX ORDER — TRAZODONE HYDROCHLORIDE 100 MG/1
100 TABLET ORAL NIGHTLY PRN
Status: DISCONTINUED | OUTPATIENT
Start: 2024-12-13 | End: 2024-12-13

## 2024-12-13 RX ORDER — HYDROXYZINE HYDROCHLORIDE 50 MG/1
50 TABLET, FILM COATED ORAL EVERY 4 HOURS PRN
Status: DISCONTINUED | OUTPATIENT
Start: 2024-12-13 | End: 2024-12-13

## 2024-12-13 RX ORDER — LORAZEPAM 1 MG/1
2 TABLET ORAL EVERY 4 HOURS PRN
Status: DISCONTINUED | OUTPATIENT
Start: 2024-12-13 | End: 2024-12-20 | Stop reason: HOSPADM

## 2024-12-13 RX ORDER — DIPHENHYDRAMINE HYDROCHLORIDE 50 MG/ML
50 INJECTION INTRAMUSCULAR; INTRAVENOUS EVERY 4 HOURS PRN
Status: DISCONTINUED | OUTPATIENT
Start: 2024-12-13 | End: 2024-12-20 | Stop reason: HOSPADM

## 2024-12-13 RX ORDER — HALOPERIDOL 5 MG/ML
10 INJECTION INTRAMUSCULAR EVERY 4 HOURS PRN
Status: DISCONTINUED | OUTPATIENT
Start: 2024-12-13 | End: 2024-12-20 | Stop reason: HOSPADM

## 2024-12-13 RX ORDER — TRAZODONE HYDROCHLORIDE 100 MG/1
100 TABLET ORAL NIGHTLY PRN
Status: DISCONTINUED | OUTPATIENT
Start: 2024-12-13 | End: 2024-12-16

## 2024-12-13 RX ADMIN — TRAZODONE HYDROCHLORIDE 100 MG: 100 TABLET ORAL at 08:12

## 2024-12-13 RX ADMIN — POTASSIUM BICARBONATE 50 MEQ: 977.5 TABLET, EFFERVESCENT ORAL at 04:12

## 2024-12-13 RX ADMIN — HYDROXYZINE HYDROCHLORIDE 50 MG: 50 TABLET, FILM COATED ORAL at 08:12

## 2024-12-13 NOTE — ED PROVIDER NOTES
"Encounter Date: 12/13/2024       History     Chief Complaint   Patient presents with    Psychiatric Evaluation     Pt to ED via [facility] c/o SI with plan to overdose. Pt states took approximately 20 advil pm with ETOH x2 hours ago. Also states smoked crack cocaine yesterday. Pt also states had access to a gun at his home and had thoughts of using it on himself but son removed it from his home. Denies HI, A/VH. Pt wanded in triage and changed into hospital approved attire.      Presents by EMS after an intentional OD with Advil PM. States took approx 20 tablets 2 hrs ago in a suicidal attempt with alcohol. Admits abusing of crack, with last use yesterday. Last inpatient psych/rehab was 3 years ago. States completed 28 days but relapses. He called EMS after understanding his behavior was wrong. States his son took his gun from him, if not he will shot himself.     The history is provided by the EMS personnel and the patient.     Review of patient's allergies indicates:  No Known Allergies  Past Medical History:   Diagnosis Date    Hypertension      No past surgical history on file.  Family History   Problem Relation Name Age of Onset    Stroke Mother      Hypertension Mother      Breast cancer Mother      Pacemaker/defibrilator Father      Coronary artery disease Father          with stents     Social History     Tobacco Use    Smoking status: Every Day     Current packs/day: 0.10     Average packs/day: 0.1 packs/day for 44.9 years (4.5 ttl pk-yrs)     Types: Cigarettes     Start date: 1980    Smokeless tobacco: Never   Substance Use Topics    Alcohol use: Yes     Alcohol/week: 6.0 standard drinks of alcohol     Types: 6 Cans of beer per week     Comment: DAILY BEER    Drug use: Yes     Types: Cocaine, "Crack" cocaine     Comment: DAILY     Review of Systems   Psychiatric/Behavioral:  Positive for behavioral problems and suicidal ideas.        Physical Exam     Initial Vitals [12/13/24 0304]   BP Pulse Resp Temp SpO2 "   123/81 93 18 98.4 °F (36.9 °C) 96 %      MAP       --         Physical Exam    Nursing note and vitals reviewed.  Constitutional: He appears well-developed and well-nourished. No distress.   HENT:   Head: Normocephalic and atraumatic. Mouth/Throat: Oropharynx is clear and moist. No oropharyngeal exudate.   Eyes: Conjunctivae and EOM are normal. Pupils are equal, round, and reactive to light.   Neck: Neck supple. No thyromegaly present. No JVD present.   Normal range of motion.  Cardiovascular:  Normal rate, regular rhythm, normal heart sounds and intact distal pulses.           Pulmonary/Chest: Breath sounds normal. No respiratory distress.   Abdominal: Abdomen is soft. Bowel sounds are normal. He exhibits no distension. There is no abdominal tenderness. There is no rebound and no guarding.   Musculoskeletal:         General: No edema. Normal range of motion.      Cervical back: Normal range of motion and neck supple.     Neurological: He is alert and oriented to person, place, and time. He has normal strength. GCS score is 15. GCS eye subscore is 4. GCS verbal subscore is 5. GCS motor subscore is 6.   Skin: Skin is warm and dry. No rash noted.   Psychiatric: His speech is normal and behavior is normal. He is not actively hallucinating. Cognition and memory are normal. He expresses impulsivity and inappropriate judgment. He exhibits a depressed mood. He expresses suicidal ideation. He expresses suicidal plans. He is attentive.         ED Course   Critical Care    Date/Time: 12/13/2024 6:35 AM    Performed by: Robles Giordano MD  Authorized by: Robles Giordano MD  Direct patient critical care time: 12 minutes  Additional history critical care time: 5 minutes  Ordering / reviewing critical care time: 12 minutes  Documentation critical care time: 5 minutes  Total critical care time (exclusive of procedural time) : 34 minutes  Critical care was necessary to treat or prevent imminent or  life-threatening deterioration of the following conditions: toxidrome.  Critical care was time spent personally by me on the following activities: development of treatment plan with patient or surrogate, interpretation of cardiac output measurements, evaluation of patient's response to treatment, examination of patient, obtaining history from patient or surrogate, ordering and review of laboratory studies, re-evaluation of patient's condition and review of old charts.        Labs Reviewed   COMPREHENSIVE METABOLIC PANEL - Abnormal       Result Value    Sodium 140      Potassium 3.2 (*)     Chloride 104      CO2 26      Glucose 85      Blood Urea Nitrogen 27.1 (*)     Creatinine 1.08      Calcium 10.0      Protein Total 7.8 (*)     Albumin 4.3      Globulin 3.5      Albumin/Globulin Ratio 1.2      Bilirubin Total 0.5      ALP 90      ALT 20      AST 18      eGFR >60      Anion Gap 10.0      BUN/Creatinine Ratio 25     URINALYSIS, REFLEX TO URINE CULTURE - Abnormal    Color, UA Yellow      Appearance, UA Clear      Specific Gravity, UA 1.027      pH, UA 5.5      Protein, UA Trace (*)     Glucose, UA Normal      Ketones, UA 1+ (*)     Blood, UA 1+ (*)     Bilirubin, UA Negative      Urobilinogen, UA Normal      Nitrites, UA Negative      Leukocyte Esterase, UA Negative      RBC, UA 11-20 (*)     WBC, UA 0-5      Bacteria, UA None Seen      Squamous Epithelial Cells, UA Trace (*)     Mucous, UA Few (*)     Hyaline Casts, UA None Seen     DRUG SCREEN, URINE (BEAKER) - Abnormal    Amphetamines, Urine Negative      Barbiturates, Urine Negative      Benzodiazepine, Urine Negative      Cannabinoids, Urine Negative      Cocaine, Urine Positive (*)     Fentanyl, Urine Negative      MDMA, Urine Negative      Opiates, Urine Negative      Phencyclidine, Urine Negative      pH, Urine 5.5      Specific Gravity, Urine Auto 1.027      Narrative:     Cut off concentrations:    Amphetamines - 1000 ng/ml  Barbiturates - 200  ng/ml  Benzodiazepine - 200 ng/ml  Cannabinoids (THC) - 50 ng/ml  Cocaine - 300 ng/ml  Fentanyl - 1.0 ng/ml  MDMA - 500 ng/ml  Opiates - 300 ng/ml   Phencyclidine (PCP) - 25 ng/ml    Specimen submitted for drug analysis and tested for pH and specific gravity in order to evaluate sample integrity. Suspect tampering if specific gravity is <1.003 and/or pH is not within the range of 4.5 - 8.0  False negatives may result form substances such as bleach added to urine.  False positives may result for the presence of a substance with similar chemical structure to the drug or its metabolite.    This test provides only a PRELIMINARY analytical test result. A more specific alternate chemical method must be used in order to obtain a confirmed analytical result. Gas chromatography/mass spectrometry (GC/MS) is the preferred confirmatory method. Other chemical confirmation methods are available. Clinical consideration and professional judgement should be applied to any drug of abuse test result, particularly when preliminary positive results are used.    Positive results will be confirmed only at the physicians request. Unconfirmed screening results are to be used only for medical purposes (treatment).        ACETAMINOPHEN LEVEL - Abnormal    Acetaminophen Level <3.0 (*)    SALICYLATE LEVEL - Abnormal    Salicylate Level <5.0 (*)    BLOOD GAS - Abnormal    Sample Type Venous Blood      Drawn by rn      pH, Blood gas 7.420 (*)     pCO2, Blood gas 37.0 (*)     pO2, Blood gas 61.0 (*)     TOC2, Blood gas 25.1      Base Excess, Blood gas -0.20      sO2, Blood gas 93.1      HCO3, Blood gas 24.0      THb, Blood gas 15.2      O2 Hb, Blood Gas 90.2      CO Hgb 2.6      Met Hgb 0.5      Allens Test N/A      FIO2, Blood gas 21.0     CBC WITH DIFFERENTIAL - Abnormal    WBC 8.47      RBC 5.04      Hgb 15.7      Hct 45.7      MCV 90.7      MCH 31.2 (*)     MCHC 34.4      RDW 12.9      Platelet 350      MPV 9.2      Neut % 54.0      Lymph %  32.9      Mono % 10.6      Eos % 1.7      Basophil % 0.4      Lymph # 2.79      Neut # 4.58      Mono # 0.90      Eos # 0.14      Baso # 0.03      IG# 0.03      IG% 0.4      NRBC% 0.0     SALICYLATE LEVEL - Abnormal    Salicylate Level <5.0 (*)    ACETAMINOPHEN LEVEL - Abnormal    Acetaminophen Level <3.0 (*)    COMPREHENSIVE METABOLIC PANEL - Abnormal    Sodium 139      Potassium 3.9      Chloride 104      CO2 28      Glucose 91      Blood Urea Nitrogen 26.9 (*)     Creatinine 0.99      Calcium 9.4      Protein Total 7.2      Albumin 4.0      Globulin 3.2      Albumin/Globulin Ratio 1.3      Bilirubin Total 0.6      ALP 82      ALT 19      AST 17      eGFR >60      Anion Gap 7.0      BUN/Creatinine Ratio 27     TSH - Normal    TSH 2.240     ALCOHOL,MEDICAL (ETHANOL) - Normal    Ethanol Level <10.0     PROTIME-INR - Normal    PT 13.2      INR 1.0     MAGNESIUM - Normal    Magnesium Level 2.30     PHOSPHORUS - Normal    Phosphorus Level 3.7     CBC W/ AUTO DIFFERENTIAL    Narrative:     The following orders were created for panel order CBC auto differential.  Procedure                               Abnormality         Status                     ---------                               -----------         ------                     CBC with Differential[[id number]]       Abnormal            Final result                 Please view results for these tests on the individual orders.   EXTRA TUBES    Narrative:     The following orders were created for panel order EXTRA TUBES.  Procedure                               Abnormality         Status                     ---------                               -----------         ------                     Light Green Top Hold[[id number]]                            Final result               Lavender Top Hold[[id number]]                               Final result               Gold Top Hold[[id number]]                                   Final result                 Please view  results for these tests on the individual orders.   LIGHT GREEN TOP HOLD    Extra Tube Hold for add-ons.     LAVENDER TOP HOLD    Extra Tube Hold for add-ons.     GOLD TOP HOLD    Extra Tube Hold for add-ons.     EXTRA TUBES    Narrative:     The following orders were created for panel order EXTRA TUBES.  Procedure                               Abnormality         Status                     ---------                               -----------         ------                     Light Blue Top Hold[[id number]]                                                        Lavender Top Hold[[id number]]                               In process                   Please view results for these tests on the individual orders.   LIGHT BLUE TOP HOLD   LAVENDER TOP HOLD     EKG Readings: (Independently Interpreted)   Initial Reading: No STEMI. Rhythm: Normal Sinus Rhythm. Heart Rate: 92. Ectopy: No Ectopy. Conduction: Normal. ST Segments: Non-Specific ST Segment Depression. T Waves: Normal. Axis: Normal. Other Findings: Prolonged QT Interval. Clinical Impression: Normal Sinus Rhythm Other Impression: QTc 482 ms       Imaging Results    None          Medications   nicotine 14 mg/24 hr 1 patch (1 patch Transdermal Not Given 12/13/24 4925)   potassium bicarbonate disintegrating tablet 50 mEq (50 mEq Oral Given 12/13/24 0714)     Medical Decision Making  Amount and/or Complexity of Data Reviewed  Labs: ordered. Decision-making details documented in ED Course.  ECG/medicine tests: ordered and independent interpretation performed. Decision-making details documented in ED Course.  Discussion of management or test interpretation with external provider(s): Case discussed with our psychiatric nurse for transfer. Information will be faxed to local psychiatric institutions for placement. MD will be available for report if needed after nurse report. Patient medically cleared and stable at this time for transfer process.       Risk  OTC  drugs.      Additional MDM:   Differential Diagnosis:   Schizophrenia exacerbation, bipolar psychosis, drug induced psychosis, thyrotoxicosis, renal failure, liver failure, toxydrome, among others                  Medically cleared for psychiatry placement: 12/13/2024  6:34 AM                   Clinical Impression:  Final diagnoses:  [Z00.8] Medical clearance for psychiatric admission  [T14.91XA] Suicidal behavior with attempted self-injury (Primary)  [F19.10] Polysubstance abuse          ED Disposition Condition    Transfer to Psych Facility [facility]          ED Prescriptions    None       Follow-up Information    None          Robles Giordano MD  12/13/24 06

## 2024-12-13 NOTE — ED NOTES
[facility] on site for transport. Patient wheeled to [facility] car by security with no difficulties. Patient belongings given to [facility]

## 2024-12-13 NOTE — PLAN OF CARE
Behavioral Health Unit  Psychosocial History and Assessment  Progress Note      Patient Name: Oli Sanderson YOB: 1960 SW: Keerthi Sanders Date: 2024    Chief Complaint: addictive disorder and depression    Consent:     Did the patient consent for an interview with the ? Yes    Did the patient consent for the  to contact family/friend/caregiver?   Yes  Name: Zoey Olivera, Relationship: sister, and Contact: [phone]    Did the patient give consent for the  to inform family/friend/caregiver of his/her whereabouts or to discuss discharge planning? Yes    Source of Information: Face to face with patient    Is information obtained from interviews considered reliable?   yes    Reason for Admission:     There are no hospital problems to display for this patient.      History of Present Illness - (Patient Perception):   Pt attempted suicide by taking 20 Advil PM while drinking alcohol; later called EMS. States that he has been depressed and lonely just being in the house. He has family, but he doesn't want to bother them. He states that he talks to his children on the phone but they don't come visit.     Present biopsychosocial functioning: depressed, withdrawn    Past biopsychosocial functioning: hx of depression and polysubstance abuse    Family and Marital/Relationship History:     Significant Other/Partner Relationships:  Single:  6 children, 1 .     Family Relationships: Strained      Childhood History:     Where was patient raised? [place]    Who raised the patient? parents      How does patient describe their childhood? It was wonderful, I had 2 great parents and 3 good sisters      Who is patient's primary support person? My sister, my son      Culture and Scientologist:     Scientologist: Jain    How strong of a role does Jew and spirituality play in patient's life? I pray alot    Muslim or spiritual concerns regarding treatment:  "observation of holy days  and spiritual concerns / distress    History of Abuse:   History of Abuse: Denies      Outcome: denies    Psychiatric and Medical History:     History of psychiatric illness or treatment: prior inpatient treatment, psychotropic management by PCP, prior suicide attempt(s), and has participated in counseling/psychotherapy on an outpatient basis in the past    Medical history:   Past Medical History:   Diagnosis Date    Hypertension        Substance Abuse History:     Alcohol - (Patient Perspective): pt states that he has been drinking a case of beer a day for the past six months  Social History     Substance and Sexual Activity   Alcohol Use Yes    Alcohol/week: 6.0 standard drinks of alcohol    Types: 6 Cans of beer per week    Comment: DAILY BEER       Drugs - (Patient Perspective): Pt states that he relapsed on cocaine about 4 days ago and has been smoking daily until he ran out of money  Social History     Substance and Sexual Activity   Drug Use Yes    Types: Cocaine, "Crack" cocaine    Comment: DAILY       Education:     Currently Enrolled?  Dropped out in 9th grade    Special Education? 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 Yes    Who manages finances/personal affairs? self      Service:     [place]? no    Combat Service? No     Community Resources:     Describe present use of community resources: inpatient and outpatient      Identify previously used community resources   (Include previous mental health treatment - outpatient and inpatient): inpatient an outpatient     Environmental:     Current living situation:Lives alone @ [address], [place]    Social Evaluation:     Patient Assets: General fund of knowledge and Capable of independent living    Patient Limitations: poor " coping skills, minimal social support, potential for relapse    High risk psychosocial issues that may impact discharge planning:   None at this time    Recommendations:     Anticipated discharge plan:   Rehab    High risk issues requiring early treatment planning and immediate intervention: none at this time    Community resources needed for discharge planning:  aftercare treatment sources    Anticipated social work role(s) in treatment and discharge planning: advice and [place], groups, individual as needed and referral to aftercare.    12/13/24 1185   Initial Information   Source of Information patient   Reason for Admission depression, polysubstance abuse   Patient Aware of Diagnosis yes   Arrived From emergency department   Spiritual Beliefs   Spiritual, Cultural Beliefs, [facility]itan Practices, Values that Affect Care yes   Description of Beliefs that Will Affect Care Congregational   Substance Use/Withdrawal   Substance Use Current, used prior to admission   Additional Tobacco Use   How many cigarettes do you typically have per day? 3   Abuse Screen (yes response referral indicated)   Feels Unsafe at Home or Work/School no   Feels Threatened by Someone no   Does anyone try to keep you from having contact with others or doing things outside your home? no   Physical Signs of Abuse Present no   Abuse Details   Physical Abuse No   Sexual Abuse No   Emotional Abuse No   If applicable, has the abuse been reported? No   AUDIT-C (Alcohol Use Disorders ID Test)   Alcohol Use In Past Year 4-->four or more times a week   Alcohol Amount Per Day In Past Year 4-->ten or more   More Than 6 Drinks On One Occasion In Past Year 4-->daily or almost daily   Total Audit C Score 12

## 2024-12-13 NOTE — ED NOTES
Received report from TANI Phillips RN. Patient sleeping at this time. [facility] patient at this time.

## 2024-12-13 NOTE — PROGRESS NOTES
12/13/24 1400   [facility] Group Therapy   Group Name Therapeutic Recreation   Specific Interventions Skilled Activity Leisure Education and Awareness   Participation Level Active;Supportive;Appropriate;Attentive;Sharing   Participation Quality Cooperative   Insight/Motivation Limited;Applies New Skills   Affect/Mood Display Appropriate;Bright   Cognition Oriented;Alert   Psychomotor WNL

## 2024-12-13 NOTE — NURSING
Patient admitted to facility  [facility]  from [facility] Alert and oriented x 4 .Calm and cooperative. No noted skin issues . Denies SI and HI ,denies anxiety,claimed of depression. Claimed of hearing voices previously but not right now. Denies visual hallucination. Ambulatory with steady gait. Patient with no known drug allergy. Patient is admitted for medication overdose and alcohol use . Denies pain.

## 2024-12-13 NOTE — PROGRESS NOTES
12/13/24 1000   [facility] Group Therapy   Group Name Therapeutic Recreation   Specific Interventions Skilled Activity Mild Exercises   Participation Level Active;Supportive;Appropriate;Attentive;Sharing   Participation Quality Cooperative   Insight/Motivation Limited;Applies New Skills   Affect/Mood Display Blunted;Appropriate   Cognition Oriented;Alert   Psychomotor WNL

## 2024-12-13 NOTE — PROGRESS NOTES
Oli is a 65y/o male admitted for [T14.91XA] Suicidal behavior with attempted self-injury (Primary) and [F19.10] Polysubstance abuse, with a uds +cocaine. CTRS met with Pt 1:1, Oli was pleasant and cooperative with poor self esteem/critical of self, reporting admission as I Relapsed, ability to perform his ADL's, and c/o B LE and back pain with extended standing. CTRS educated Pt to TR group times and dates, with Oli agreeing to attend and participate in TR groups. Oli reported his treatment goal as Get sober again.       12/13/24 1120   General   Admit Date 12/13/24   Primary Diagnosis (T14.91XA) Suicidal behavior with attempted self-injury (Primary)   Secondary Diagnosis (F19.10) Polysubstance abuse   Rastafarian spiritual   Number of Children 7   Children Living? 6   Occupation retired on SSI   Does the patient have dentures? No   If you were to take part in activities, which of the following would you prefer? Both   Do you feel like you have enough to keep you busy now? Yes   Do you believe that you have the opportunity for physical activity? Yes   Activity Capabilities Moderate   Subjective   Patient states I Relapsed   Assessment   Mobility ambulates independently   Transfers independently   Musculoskeletal pain  (c/o B LE and back pain with extended standing)   Visual Acuity normal vision   Visual Perception depth perception;color perception;recognizes letters;recognizes numbers   Hearing normal   Speech/Communication normal   Cognitive Concerns oriented x4   Emotional Concerns poor self image   Leisure Interest Survey   Leisure Interest Survey Yes   Social/Group Activities   Mosque/[place] Would like to learn/do   Clubs/Organization Would like to learn/do   Solitary Activities   Watching TV Current Interest   Watching Videos Current Interest   Music Listening Current Interest   Physical Activities   Walk/Run Current Interest   Creative Activities   Cooking Current Interest   Outdoor Activities    Hunting Past Interest   Horseback Riding Past Interest   Spectator Events   Sporting Events Current Interest   Passive Games   Card Games Current Interest   Goals   Additional Documentation yes   Goal Formulation With patient   Time For Goal Achievement 7 days   Goal 1 Get sober again   Goal 1-Progress Ongoing- progressing,   Plan   Planned Therapy Intervention Group Recreational Therapy   Expected Length of Stay 5-7days   PT Frequency Minimum of 3 visits per week

## 2024-12-13 NOTE — PLAN OF CARE
"  Problem: Adult Behavioral Health Plan of Care  Goal: Plan of Care Review  Outcome: Not Progressing  Flowsheets (Taken 12/13/2024 1201)  Patient Agreement with Plan of Care: agrees  Plan of Care Reviewed With: patient  Goal: Patient-Specific Goal (Individualization)  Outcome: Not Progressing  Flowsheets (Taken 12/13/2024 1201)  Patient Personal Strengths:   ability to maintain sobriety   medication/treatment adherence  Patient Vulnerabilities:   substance abuse/addiction   poor impulse control  Individualized Care Needs: tim " I am OK now"  Anxieties, Fears or Concerns: deneis  Patient/Family-Specific Goals (Include Timeframe): deneis  Goal: Adheres to Safety Considerations for Self and Others  Outcome: Not Progressing  Flowsheets (Taken 12/13/2024 1201)  Adheres to Safety Considerations for Self and Others: making progress toward outcome  Goal: Absence of New-Onset Illness or Injury  Outcome: Not Progressing  Intervention: Prevent Infection  Flowsheets (Taken 12/13/2024 1201)  Infection Prevention: hand hygiene promoted  Goal: Optimized Coping Skills in Response to Life Stressors  Outcome: Not Progressing  Flowsheets (Taken 12/13/2024 1201)  Optimized Coping Skills in Response to Life Stressors: making progress toward outcome  Goal: Develops/Participates in Therapeutic [place] to Support Successful Transition  Outcome: Not Progressing  Flowsheets (Taken 12/13/2024 1201)  Develops/Participates in Therapeutic [place] to Support Successful Transition: unable to achieve outcome  Goal: Rounds/Family Conference  Outcome: Not Progressing     Problem: Violence Risk or Actual  Goal: Anger and Impulse Control  Outcome: Not Progressing     Problem: Suicide Risk  Goal: Absence of Self-Harm  Outcome: Not Progressing     "

## 2024-12-14 PROCEDURE — 11400000 HC PSYCH PRIVATE ROOM

## 2024-12-14 PROCEDURE — 25000003 PHARM REV CODE 250: Performed by: PEDIATRICS

## 2024-12-14 PROCEDURE — 25000003 PHARM REV CODE 250

## 2024-12-14 PROCEDURE — 25000003 PHARM REV CODE 250: Performed by: PSYCHIATRY & NEUROLOGY

## 2024-12-14 RX ORDER — IBUPROFEN 400 MG/1
400 TABLET ORAL EVERY 6 HOURS PRN
Status: DISCONTINUED | OUTPATIENT
Start: 2024-12-14 | End: 2024-12-20 | Stop reason: HOSPADM

## 2024-12-14 RX ORDER — CLONIDINE HYDROCHLORIDE 0.1 MG/1
0.2 TABLET ORAL EVERY 8 HOURS PRN
Status: DISCONTINUED | OUTPATIENT
Start: 2024-12-14 | End: 2024-12-20 | Stop reason: HOSPADM

## 2024-12-14 RX ORDER — BUPROPION HYDROCHLORIDE 150 MG/1
150 TABLET ORAL DAILY
Status: DISCONTINUED | OUTPATIENT
Start: 2024-12-14 | End: 2024-12-20 | Stop reason: HOSPADM

## 2024-12-14 RX ORDER — AMLODIPINE BESYLATE 5 MG/1
5 TABLET ORAL DAILY
Status: DISCONTINUED | OUTPATIENT
Start: 2024-12-14 | End: 2024-12-20 | Stop reason: HOSPADM

## 2024-12-14 RX ADMIN — CLONIDINE HYDROCHLORIDE 0.2 MG: 0.1 TABLET ORAL at 08:12

## 2024-12-14 RX ADMIN — HYDROXYZINE HYDROCHLORIDE 50 MG: 50 TABLET, FILM COATED ORAL at 08:12

## 2024-12-14 RX ADMIN — ACETAMINOPHEN 650 MG: 325 TABLET, FILM COATED ORAL at 08:12

## 2024-12-14 RX ADMIN — AMLODIPINE BESYLATE 5 MG: 5 TABLET ORAL at 10:12

## 2024-12-14 RX ADMIN — HYDROXYZINE HYDROCHLORIDE 50 MG: 50 TABLET, FILM COATED ORAL at 10:12

## 2024-12-14 RX ADMIN — TRAZODONE HYDROCHLORIDE 100 MG: 100 TABLET ORAL at 08:12

## 2024-12-14 RX ADMIN — BUPROPION HYDROCHLORIDE 150 MG: 150 TABLET, EXTENDED RELEASE ORAL at 10:12

## 2024-12-14 NOTE — NURSING
2011 Pt requesting PRN sleep and PRN anxiety medication. Administered trazodone 100 mg po and atarax 50 mg po.       2200 Pt in bed, eyes closed, resting quietly with no signs of anxiety.

## 2024-12-14 NOTE — PLAN OF CARE
He is AAO X 4. Reports anxious and depressed. Guarded. Poor eye contact noted. In dayroom, watching TV. Denies pain at present. Compliant with meds. Participating in groups. Received prn meds for anxiety and sleep. Continue plan of care and provide a safe and therapeutic environment. Every fifteen minute rounding continued for safety.

## 2024-12-14 NOTE — NURSING
At, 1023, complaints of anxiety.  Anxiety level 9/10.  Atarax 50 mg., administered by mouth.  At 1115, patient verbalizes slight relief from anxiety.  Anxiety level 8/10.

## 2024-12-14 NOTE — H&P
"12/14/2024  Oli Sanderson   1960   [id number]            Psychiatry Inpatient Admission Note    Date of Admission: 12/13/2024 10:03 AM    Current Legal Status: Physician's Emergency Certificate    Chief Complaint: Depressed    SUBJECTIVE:   History of Present Illness:   Oli Sanderson is a 64 y.o. male placed under a PEC at [facility] with suicidal ideation with an attempt to OD on sleeping pills while drinking alcohol.     The patient has an extensive history of substance use and depression, presenting after a relapse and endorsing depressive symptoms and anxiety.    The patient was last admitted in May for a similar presentation involving an attempted overdose on OTC sleeping medication. He completed a 28-day rehabilitation program at that time and maintained sobriety for a couple of months. However, he has since relapsed, initially on alcohol and subsequently on cocaine, attributing the relapse to financial and life stressors, including issues such as home repairs he could not afford.    The patient reports positive results from a recent medication change to Wellbutrin, stating that when sober and consistent with his medication, he felt "OK." However, he stopped attending meetings, which likely contributed to his relapse. The patient expresses interest in returning to rehab post-discharge to address his substance use.  He continues to endorse ongoing depression and anxiety but denies acute suicidal ideations during this exam.    Assessment:  The patients relapse and increased depressive symptoms highlight the need for continued stabilization and a focus on addressing both his mood disorder and substance use. His insight into the benefits of Wellbutrin and interest in rehabilitation indicate readiness to re-engage in treatment.      Past Psychiatric History:   Previous Psychiatric Hospitalizations: Once   Previous Medication Trials: Lexapro, Wellbutrin, Depakote  Previous Suicide Attempts: Once   Outpatient " "psychiatrist: Denies    Past Medical/Surgical History:   Past Medical History:   Diagnosis Date    Hypertension      No past surgical history on file.    Family Psychiatric History:   Denies     Allergies:   Review of patient's allergies indicates:  No Known Allergies    Substance Abuse History:   Tobacco: 1 PPW  Alcohol: 6 beer per day  Illicit Substances: Cocaine  Treatment: Denies      Current Medications:   Home Psychiatric Meds: Denies    Scheduled Meds:    amLODIPine  5 mg Oral Daily    nicotine  1 patch Transdermal Daily      PRN Meds:   Current Facility-Administered Medications:     acetaminophen, 650 mg, Oral, Q6H PRN    aluminum-magnesium hydroxide-simethicone, 30 mL, Oral, Q6H PRN    cloNIDine, 0.2 mg, Oral, Q8H PRN    haloperidoL, 10 mg, Oral, Q4H PRN **AND** diphenhydrAMINE, 50 mg, Oral, Q4H PRN **AND** LORazepam, 2 mg, Oral, Q4H PRN **AND** haloperidol lactate, 10 mg, Intramuscular, Q4H PRN **AND** diphenhydrAMINE, 50 mg, Intramuscular, Q4H PRN **AND** lorazepam, 2 mg, Intramuscular, Q4H PRN    hydrOXYzine HCL, 50 mg, Oral, Q4H PRN    traZODone, 100 mg, Oral, Nightly PRN   Psychotherapeutics (From admission, onward)      Start     Stop Route Frequency Ordered    12/13/24 2104  traZODone tablet 100 mg         -- Oral Nightly PRN 12/13/24 2004    12/13/24 1143  haloperidoL tablet 10 mg  (Med - Acute  Behavioral Management)        Placed in "And" Linked Group    -- Oral Every 4 hours PRN 12/13/24 1143    12/13/24 1143  LORazepam tablet 2 mg  (Med - Acute  Behavioral Management)        Placed in "And" Linked Group    -- Oral Every 4 hours PRN 12/13/24 1143    12/13/24 1143  haloperidol lactate injection 10 mg  (Med - Acute  Behavioral Management)        Placed in "And" Linked Group    -- IM Every 4 hours PRN 12/13/24 1143    12/13/24 1143  LORazepam injection 2 mg  (Med - Acute  Behavioral Management)        Placed in "And" Linked Group    -- IM Every 4 hours PRN 12/13/24 1143              Social " History:  Housing Status: Lives alone  Relationship Status/Sexual Orientation: Single   Children: 7 total 1   Education: 9th   Employment Status/Info: Cuts yards    history: Denies  History of physical/sexual abuse: Denies   Access to gun: Denies       Legal History:   Past Charges/Incarcerations: Child support   Pending charges: Denies      OBJECTIVE:   Medical Review Of Systems:  HTN    Vitals   Vitals:    24 0701   BP: (!) 143/92   Pulse: 92   Resp: 18   Temp: 98.2 °F (36.8 °C)        Labs/Imaging/Studies:   Recent Results (from the past 48 hours)   Comprehensive metabolic panel    Collection Time: 24  3:10 AM   Result Value Ref Range    Sodium 140 136 - 145 mmol/L    Potassium 3.2 (L) 3.5 - 5.1 mmol/L    Chloride 104 98 - 107 mmol/L    CO2 26 23 - 31 mmol/L    Glucose 85 82 - 115 mg/dL    Blood Urea Nitrogen 27.1 (H) 8.4 - 25.7 mg/dL    Creatinine 1.08 0.72 - 1.25 mg/dL    Calcium 10.0 8.8 - 10.0 mg/dL    Protein Total 7.8 (H) 5.8 - 7.6 gm/dL    Albumin 4.3 3.4 - 4.8 g/dL    Globulin 3.5 2.4 - 3.5 gm/dL    Albumin/Globulin Ratio 1.2 1.1 - 2.0 ratio    Bilirubin Total 0.5 <=1.5 mg/dL    ALP 90 40 - 150 unit/L    ALT 20 0 - 55 unit/L    AST 18 5 - 34 unit/L    eGFR >60 mL/min/1.73/m2    Anion Gap 10.0 mEq/L    BUN/Creatinine Ratio 25    TSH    Collection Time: 24  3:10 AM   Result Value Ref Range    TSH 2.240 0.350 - 4.940 uIU/mL   Ethanol    Collection Time: 24  3:10 AM   Result Value Ref Range    Ethanol Level <10.0 <=10.0 mg/dL   Acetaminophen level    Collection Time: 24  3:10 AM   Result Value Ref Range    Acetaminophen Level <3.0 (L) 10.0 - 30.0 ug/ml   Salicylate level    Collection Time: 24  3:10 AM   Result Value Ref Range    Salicylate Level <5.0 (L) 15.0 - 30.0 mg/dL   Protime-INR    Collection Time: 24  3:10 AM   Result Value Ref Range    PT 13.2 11.4 - 14.0 seconds    INR 1.0 <=1.3   Magnesium    Collection Time: 24  3:10 AM   Result  Value Ref Range    Magnesium Level 2.30 1.60 - 2.60 mg/dL   Phosphorus    Collection Time: 12/13/24  3:10 AM   Result Value Ref Range    Phosphorus Level 3.7 2.3 - 4.7 mg/dL   CBC with Differential    Collection Time: 12/13/24  3:11 AM   Result Value Ref Range    WBC 8.47 4.50 - 11.50 x10(3)/mcL    RBC 5.04 4.70 - 6.10 x10(6)/mcL    Hgb 15.7 14.0 - 18.0 g/dL    Hct 45.7 42.0 - 52.0 %    MCV 90.7 80.0 - 94.0 fL    MCH 31.2 (H) 27.0 - 31.0 pg    MCHC 34.4 33.0 - 36.0 g/dL    RDW 12.9 11.5 - 17.0 %    Platelet 350 130 - 400 x10(3)/mcL    MPV 9.2 7.4 - 10.4 fL    Neut % 54.0 %    Lymph % 32.9 %    Mono % 10.6 %    Eos % 1.7 %    Basophil % 0.4 %    Lymph # 2.79 0.6 - 4.6 x10(3)/mcL    Neut # 4.58 2.1 - 9.2 x10(3)/mcL    Mono # 0.90 0.1 - 1.3 x10(3)/mcL    Eos # 0.14 0 - 0.9 x10(3)/mcL    Baso # 0.03 <=0.2 x10(3)/mcL    IG# 0.03 0 - 0.04 x10(3)/mcL    IG% 0.4 %    NRBC% 0.0 %   Light Green Top Hold    Collection Time: 12/13/24  3:14 AM   Result Value Ref Range    Extra Tube Hold for add-ons.    Lavender Top Hold    Collection Time: 12/13/24  3:14 AM   Result Value Ref Range    Extra Tube Hold for add-ons.    Gold Top Hold    Collection Time: 12/13/24  3:14 AM   Result Value Ref Range    Extra Tube Hold for add-ons.    EKG 12-lead    Collection Time: 12/13/24  3:24 AM   Result Value Ref Range    QRS Duration 88 ms    OHS QTC Calculation 482 ms   RT Blood Gas    Collection Time: 12/13/24  3:30 AM   Result Value Ref Range    Sample Type Venous Blood     Drawn by rn     pH, Blood gas 7.420 (H) 7.300 - 7.400    pCO2, Blood gas 37.0 (L) 40.0 - 50.0 mmHg    pO2, Blood gas 61.0 (H) 30.0 - 40.0 mmHg    TOC2, Blood gas 25.1 mmol/L    Base Excess, Blood gas -0.20 mmol/L    sO2, Blood gas 93.1 %    HCO3, Blood gas 24.0 mmol/L    THb, Blood gas 15.2 g/dL    O2 Hb, Blood Gas 90.2 %    CO Hgb 2.6 %    Met Hgb 0.5 %    Allens Test N/A     FIO2, Blood gas 21.0 %   Urinalysis, Reflex to Urine Culture    Collection Time: 12/13/24  5:53 AM     Specimen: Urine   Result Value Ref Range    Color, UA Yellow Yellow, Light-Yellow, Dark Yellow, Neha, Straw    Appearance, UA Clear Clear    Specific Gravity, UA 1.027 1.005 - 1.030    pH, UA 5.5 5.0 - 8.5    Protein, UA Trace (A) Negative    Glucose, UA Normal Negative, Normal    Ketones, UA 1+ (A) Negative    Blood, UA 1+ (A) Negative    Bilirubin, UA Negative Negative    Urobilinogen, UA Normal 0.2, 1.0, Normal    Nitrites, UA Negative Negative    Leukocyte Esterase, UA Negative Negative    RBC, UA 11-20 (A) None Seen, 0-2, 3-5, 0-5 /HPF    WBC, UA 0-5 None Seen, 0-2, 3-5, 0-5 /HPF    Bacteria, UA None Seen None Seen /HPF    Squamous Epithelial Cells, UA Trace (A) None Seen /HPF    Mucous, UA Few (A) None Seen /LPF    Hyaline Casts, UA None Seen None Seen /lpf   Drug Screen, Urine    Collection Time: 12/13/24  5:53 AM   Result Value Ref Range    Amphetamines, Urine Negative Negative    Barbiturates, Urine Negative Negative    Benzodiazepine, Urine Negative Negative    Cannabinoids, Urine Negative Negative    Cocaine, Urine Positive (A) Negative    Fentanyl, Urine Negative Negative    MDMA, Urine Negative Negative    Opiates, Urine Negative Negative    Phencyclidine, Urine Negative Negative    pH, Urine 5.5 3.0 - 11.0    Specific Gravity, Urine Auto 1.027 1.001 - 1.035   Salicylate Level    Collection Time: 12/13/24  5:58 AM   Result Value Ref Range    Salicylate Level <5.0 (L) 15.0 - 30.0 mg/dL   Acetaminophen Level    Collection Time: 12/13/24  5:58 AM   Result Value Ref Range    Acetaminophen Level <3.0 (L) 10.0 - 30.0 ug/ml   Comprehensive metabolic panel    Collection Time: 12/13/24  5:58 AM   Result Value Ref Range    Sodium 139 136 - 145 mmol/L    Potassium 3.9 3.5 - 5.1 mmol/L    Chloride 104 98 - 107 mmol/L    CO2 28 23 - 31 mmol/L    Glucose 91 82 - 115 mg/dL    Blood Urea Nitrogen 26.9 (H) 8.4 - 25.7 mg/dL    Creatinine 0.99 0.72 - 1.25 mg/dL    Calcium 9.4 8.8 - 10.0 mg/dL    Protein Total 7.2 5.8  "- 7.6 gm/dL    Albumin 4.0 3.4 - 4.8 g/dL    Globulin 3.2 2.4 - 3.5 gm/dL    Albumin/Globulin Ratio 1.3 1.1 - 2.0 ratio    Bilirubin Total 0.6 <=1.5 mg/dL    ALP 82 40 - 150 unit/L    ALT 19 0 - 55 unit/L    AST 17 5 - 34 unit/L    eGFR >60 mL/min/1.73/m2    Anion Gap 7.0 mEq/L    BUN/Creatinine Ratio 27    Lavender Top Hold    Collection Time: 12/13/24  6:03 AM   Result Value Ref Range    Extra Tube Hold for add-ons.       No results found for: "PHENYTOIN", "PHENOBARB", "VALPROATE", "CBMZ"        Psychiatric Mental Status Exam:  General Appearance: appears stated age, well developed and nourished, adequately groomed and appropriately dressed, in no acute distress  Arousal: alert with clear sensorium  Behavior: normal; cooperative; reasonably friendly, pleasant, and polite; appropriate eye-contact; under good behavioral control  Movements and Motor Activity: no abnormal involuntary movements noted; no tics, no tremors, no akathisia, no dystonia, no evidence of tardive dyskinesia; no psychomotor agitation or retardation  Orientation: intact; oriented fully to person, place, time and situation  Speech: intact; normal rate, rhythm, volume, tone and pitch; conversational, spontaneous, and coherent  Mood: Depressed  Affect: constricted  Thought Process: intact, linear, goal-directed, organized, logical  Associations: intact, no loosening of associations  Thought Content and Perceptions: recent suicidal ideation, no homicidal ideation, no auditory or visual hallucinations, no paranoid ideation, no ideas of reference, no evidence of delusions or psychosis  Recent and Remote Memory: grossly intact, able to recall relevant and salient information from the recent and remote past; per interview/observation with patient  Attention and Concentration: intact; per interview/observation with patient  Fund of Knowledge: intact; based on history, vocabulary, fund of knowledge, syntax, grammar, and content  Insight: intact; based on " understanding of severity of illness and HPI  Judgment: intact; based on patient's behavior and HPI      Patient Strengths:  Access to care, Able to verbalize needs, Stable physical health, Motivation for treatment, and Capable of independent living      Patient Liabilities:  Medication non-compliance, Substance use, and Depression      Discharge Criteria:  Improved mood, Improved thought process, Medication compliance, Improved coping skills, and Improved health maintenance      Reason for Admission:  The patient poses a significant and immediate danger to self. and To stabilize disabling psychiatric/psychological symptoms of sudden onset.    ASSESSMENT/PLAN:   Diagnoses:  SUBSTANCE-RELATED DISORDERS; Alcohol-Related Disorders; Alcohol Abuse Without Physiological Dependence  and MOOD DISORDERS; Major Depressive Disorder, Recurrent: Severe Without Psychotic Features (F33.2)         Past Medical History:   Diagnosis Date    Hypertension         Problem lists and Management Plans:  -Admit to [facility]    Medication Management:  -Continue Wellbutrin to manage depressive symptoms.  -Reassess for any additional medication needs to address anxiety if symptoms persist.    Substance Use Treatment:  -Collaborate with staff to facilitate a referral for a rehabilitation program upon discharge, as requested by the patient.    -Will attempt to obtain outside psychiatric records if available  - to assist with aftercare planning and collateral  -Continue inpatient treatment as evidenced by suicidal ideation      Estimated length of stay: 5-7    Estimated Disposition: Substance treatment program    Estimated Follow-up: Outpatient medication management and Substance treatment program      On this date, I have reviewed the medical history and Nursing Assessment, as well as records from referral source.  I have evaluated the mental status of the above named person and concur with the findings of all assessments.  I have  provided medical direction for the development of the Treatment Plan.    I conclude that this patient meets admission criteria for inpatient treatment.  I certify that this patient poses a danger to self or others, or would otherwise be considered gravely disabled based on this assessment and/or provided collateral information.     I have provided medical direction for the development of the Treatment plan.  These services will be provided while this patient is under my care and will be based on an individualized plan of care.  The patient can demonstrate a reasonable expectation of improvement in his/her disorder as a result of the active treatment being provided.      Thaddeus Hernandez [facility]

## 2024-12-14 NOTE — NURSING
At 0849, complaints of back pain, PS 9/10.  Tylenol 650 mg., administered by mouth.  At 0949, verbalizes no relief from discomfort.

## 2024-12-14 NOTE — PLAN OF CARE
Problem: Adult Behavioral Health Plan of Care  Goal: Plan of Care Review  Outcome: Progressing  Flowsheets (Taken 12/14/2024 0825)  Patient Agreement with Plan of Care: agrees  Plan of Care Reviewed With: patient  Goal: Patient-Specific Goal (Individualization)  Outcome: Progressing  Flowsheets (Taken 12/14/2024 0825)  Patient Personal Strengths: independent living skills  Patient Vulnerabilities: substance abuse/addiction  Goal: Adheres to Safety Considerations for Self and Others  Outcome: Progressing  Flowsheets (Taken 12/14/2024 0825)  Adheres to Safety Considerations for Self and Others: making progress toward outcome  Intervention: Develop and Maintain Individualized Safety Plan  Flowsheets (Taken 12/14/2024 0825)  Safety Measures: safety rounds completed  Goal: Absence of New-Onset Illness or Injury  Outcome: Progressing  Intervention: Identify and Manage Fall Risk  Flowsheets (Taken 12/14/2024 0825)  Safety Measures: safety rounds completed  Intervention: Prevent VTE (Venous Thromboembolism)  Flowsheets (Taken 12/14/2024 0825)  VTE Prevention/Management: fluids promoted  Intervention: Prevent Infection  Flowsheets (Taken 12/14/2024 0825)  Infection Prevention: rest/sleep promoted  Goal: Optimized Coping Skills in Response to Life Stressors  Outcome: Progressing  Flowsheets (Taken 12/14/2024 0825)  Optimized Coping Skills in Response to Life Stressors: making progress toward outcome  Intervention: Promote Effective Coping Strategies  Flowsheets (Taken 12/14/2024 0825)  Supportive Measures: self-care encouraged  Goal: Develops/Participates in Therapeutic [place] to Support Successful Transition  Outcome: Progressing  Flowsheets (Taken 12/14/2024 0825)  Develops/Participates in Therapeutic [place] to Support Successful Transition: making progress toward outcome  Intervention: Foster Therapeutic [place]  Flowsheets (Taken 12/14/2024 0825)  Trust Relationship/Rapport: care explained  Goal: Rounds/Family  Conference  Outcome: Progressing  Flowsheets (Taken 12/14/2024 0825)  Participants:   milieu/psych techs   nursing     Problem: Violence Risk or Actual  Goal: Anger and Impulse Control  Outcome: Progressing  Intervention: Minimize Safety Risk  Flowsheets (Taken 12/14/2024 0825)  Behavior Management: behavioral plan reviewed  Sensory Stimulation Regulation: quiet environment promoted  De-Escalation Techniques: quiet time facilitated  Intervention: Promote Self-Control  Flowsheets (Taken 12/14/2024 0825)  Supportive Measures: self-care encouraged  Environmental Support: calm environment promoted     Problem: Suicide Risk  Goal: Absence of Self-Harm  Outcome: Progressing  Intervention: Assess Risk to Self and Maintain Safety  Flowsheets (Taken 12/14/2024 0825)  Behavior Management: behavioral plan reviewed  Self-Harm Prevention: environmental self-harm risks assessed  Intervention: Promote Psychosocial Wellbeing  Flowsheets (Taken 12/14/2024 0825)  Sleep/Rest Enhancement: regular sleep/rest pattern promoted  Supportive Measures: self-care encouraged  Family/Support System Care: self-care encouraged  Intervention: Establish Safety Plan and Continuity of Care  Flowsheets (Taken 12/14/2024 0825)  Safe Transition Promotion: access to lethal means addressed     Problem: Excessive Substance Use  Goal: Optimized Energy Level (Excessive Substance Use)  Outcome: Progressing  Flowsheets (Taken 12/14/2024 0825)  Mutually Determined Action Steps (Optimized Energy Level): grooms self without prompting  Intervention: Optimize Energy Level  Flowsheets (Taken 12/14/2024 0825)  Activity (Behavioral Health): up in chair  Patient Performed Hygiene: teeth brushed  Diversional Activity: television  Goal: Improved Behavioral Control (Excessive Substance Use)  Outcome: Progressing  Flowsheets (Taken 12/14/2024 0825)  Mutually Determined Action Steps (Improved Behavioral Control): identifies major stressors  Intervention: Promote Behavior and  Impulse Control  Flowsheets (Taken 12/14/2024 0825)  Behavior Management: behavioral plan reviewed  Goal: Increased Participation and Engagement (Excessive Substance Use)  Outcome: Progressing  Flowsheets (Taken 12/14/2024 0825)  Mutually Determined Action Steps (Increased Participation and Engagement): discusses ongoing recovery plan  Intervention: Facilitate Participation and Engagement  Flowsheets (Taken 12/14/2024 0825)  Supportive Measures: self-care encouraged  Diversional Activity: television  Goal: Improved Physiologic Symptoms (Excessive Substance Use)  Outcome: Progressing  Flowsheets (Taken 12/14/2024 0825)  Mutually Determined Action Steps (Improved Physiologic Symptoms): discusses use pattern  Intervention: Optimize Physiologic Function  Flowsheets (Taken 12/14/2024 0825)  Oral Nutrition Promotion: rest periods promoted  Nutrition Interventions: food preferences provided  Goal: Enhanced Social, Occupational or Functional Skills (Excessive Substance Use)  Outcome: Progressing  Flowsheets (Taken 12/14/2024 0825)  Mutually Determined Action Steps (Enhanced Social, Occupational or Functional Skills): participates in social skills training  Intervention: Promote Social, Occupational and Functional Ability  Flowsheets (Taken 12/14/2024 0825)  Trust Relationship/Rapport: care explained  Social Functional Ability Promotion: autonomy promoted     Problem: Hypertension Acute  Goal: Blood Pressure Within Desired Range  Outcome: Progressing  Intervention: Normalize Blood Pressure  Flowsheets (Taken 12/14/2024 0825)  Sensory Stimulation Regulation: quiet environment promoted    He is AAO X 4. Flat affect and blunted mood. Endorses anxiety this AM. Denies SI and HI. Guarded. Minimal interactions with other patients and staff. Good eye contact noted. Speech normal tone and speed. Continue plan of care and provide a safe and therapeutic environment. Continue to monitor every fifteen minutes for safety.

## 2024-12-14 NOTE — H&P
"[facility] - Behavioral Health Unit  History & Physical    Subjective:      Chief Complaint/Reason for Admission: alcohol and substance abuse of cocaine     Oli Sanderson is a 64 y.o. male. Alcohol and drug abuse     Past Medical History:   Diagnosis Date    Hypertension      No past surgical history on file.  Family History   Problem Relation Name Age of Onset    Stroke Mother      Hypertension Mother      Breast cancer Mother      Pacemaker/defibrilator Father      Coronary artery disease Father          with stents     Social History     Tobacco Use    Smoking status: Every Day     Current packs/day: 0.50     Average packs/day: 0.3 packs/day for 45.0 years (14.4 ttl pk-yrs)     Types: Cigarettes     Start date: 1980    Smokeless tobacco: Never   Substance Use Topics    Alcohol use: Yes     Alcohol/week: 6.0 standard drinks of alcohol     Types: 6 Cans of beer per week     Comment: DAILY BEER    Drug use: Yes     Types: Cocaine, "Crack" cocaine     Comment: DAILY       PTA Medications   Medication Sig    amLODIPine (NORVASC) 5 MG tablet Take 1 tablet (5 mg total) by mouth once daily.    cloNIDine (CATAPRES) 0.1 MG tablet Take 2 tablets (0.2 mg total) by mouth every 8 (eight) hours as needed (180/100).    EScitalopram oxalate (LEXAPRO) 20 MG tablet Take 1 tablet (20 mg total) by mouth once daily.    lisinopriL 10 MG tablet Take 1 tablet (10 mg total) by mouth once daily.    metoprolol succinate (TOPROL-XL) 25 MG 24 hr tablet Take 1 tablet (25 mg total) by mouth once daily.     Review of patient's allergies indicates:  No Known Allergies     Review of Systems   Constitutional: Negative.    HENT: Negative.     Eyes: Negative.    Respiratory: Negative.     Cardiovascular: Negative.    Gastrointestinal: Negative.    Genitourinary: Negative.    Musculoskeletal: Negative.    Skin: Negative.    Neurological: Negative.    Endo/Heme/Allergies: Negative.    Psychiatric/Behavioral:  Positive for depression, " hallucinations, substance abuse and suicidal ideas.        Objective:      Vital Signs (Most Recent)  Temp: 98.2 °F (36.8 °C) (12/14/24 0701)  Pulse: 92 (12/14/24 0701)  Resp: 18 (12/14/24 0701)  BP: (!) 143/92 (12/14/24 0701)  SpO2: 99 % (12/14/24 0701)    Vital Signs Range (Last 24H):  Temp:  [98.1 °F (36.7 °C)-98.7 °F (37.1 °C)]   Pulse:  []   Resp:  [18-20]   BP: (122-143)/(73-92)   SpO2:  [96 %-99 %]     Physical Exam  HENT:      Head: Normocephalic.      Right Ear: Tympanic membrane normal.      Left Ear: Tympanic membrane normal.      Nose: Nose normal.      Mouth/Throat:      Mouth: Mucous membranes are moist.   Eyes:      Extraocular Movements: Extraocular movements intact.      Pupils: Pupils are equal, round, and reactive to light.   Cardiovascular:      Rate and Rhythm: Normal rate and regular rhythm.   Pulmonary:      Effort: Pulmonary effort is normal.   Abdominal:      General: Abdomen is flat.   Musculoskeletal:         General: Normal range of motion.   Skin:     General: Skin is warm.   Neurological:      General: No focal deficit present.      Mental Status: He is alert and oriented to person, place, and time.      Comments: Vision normal   Hearing normal   EOM intact   Face muscles normal  Facial sensation normal   Shrugs shoulders  Tongue midline            Data Review:    Recent Results (from the past 48 hours)   Comprehensive metabolic panel    Collection Time: 12/13/24  3:10 AM   Result Value Ref Range    Sodium 140 136 - 145 mmol/L    Potassium 3.2 (L) 3.5 - 5.1 mmol/L    Chloride 104 98 - 107 mmol/L    CO2 26 23 - 31 mmol/L    Glucose 85 82 - 115 mg/dL    Blood Urea Nitrogen 27.1 (H) 8.4 - 25.7 mg/dL    Creatinine 1.08 0.72 - 1.25 mg/dL    Calcium 10.0 8.8 - 10.0 mg/dL    Protein Total 7.8 (H) 5.8 - 7.6 gm/dL    Albumin 4.3 3.4 - 4.8 g/dL    Globulin 3.5 2.4 - 3.5 gm/dL    Albumin/Globulin Ratio 1.2 1.1 - 2.0 ratio    Bilirubin Total 0.5 <=1.5 mg/dL    ALP 90 40 - 150 unit/L    ALT 20  0 - 55 unit/L    AST 18 5 - 34 unit/L    eGFR >60 mL/min/1.73/m2    Anion Gap 10.0 mEq/L    BUN/Creatinine Ratio 25    TSH    Collection Time: 12/13/24  3:10 AM   Result Value Ref Range    TSH 2.240 0.350 - 4.940 uIU/mL   Ethanol    Collection Time: 12/13/24  3:10 AM   Result Value Ref Range    Ethanol Level <10.0 <=10.0 mg/dL   Acetaminophen level    Collection Time: 12/13/24  3:10 AM   Result Value Ref Range    Acetaminophen Level <3.0 (L) 10.0 - 30.0 ug/ml   Salicylate level    Collection Time: 12/13/24  3:10 AM   Result Value Ref Range    Salicylate Level <5.0 (L) 15.0 - 30.0 mg/dL   Protime-INR    Collection Time: 12/13/24  3:10 AM   Result Value Ref Range    PT 13.2 11.4 - 14.0 seconds    INR 1.0 <=1.3   Magnesium    Collection Time: 12/13/24  3:10 AM   Result Value Ref Range    Magnesium Level 2.30 1.60 - 2.60 mg/dL   Phosphorus    Collection Time: 12/13/24  3:10 AM   Result Value Ref Range    Phosphorus Level 3.7 2.3 - 4.7 mg/dL   CBC with Differential    Collection Time: 12/13/24  3:11 AM   Result Value Ref Range    WBC 8.47 4.50 - 11.50 x10(3)/mcL    RBC 5.04 4.70 - 6.10 x10(6)/mcL    Hgb 15.7 14.0 - 18.0 g/dL    Hct 45.7 42.0 - 52.0 %    MCV 90.7 80.0 - 94.0 fL    MCH 31.2 (H) 27.0 - 31.0 pg    MCHC 34.4 33.0 - 36.0 g/dL    RDW 12.9 11.5 - 17.0 %    Platelet 350 130 - 400 x10(3)/mcL    MPV 9.2 7.4 - 10.4 fL    Neut % 54.0 %    Lymph % 32.9 %    Mono % 10.6 %    Eos % 1.7 %    Basophil % 0.4 %    Lymph # 2.79 0.6 - 4.6 x10(3)/mcL    Neut # 4.58 2.1 - 9.2 x10(3)/mcL    Mono # 0.90 0.1 - 1.3 x10(3)/mcL    Eos # 0.14 0 - 0.9 x10(3)/mcL    Baso # 0.03 <=0.2 x10(3)/mcL    IG# 0.03 0 - 0.04 x10(3)/mcL    IG% 0.4 %    NRBC% 0.0 %   Light Green Top Hold    Collection Time: 12/13/24  3:14 AM   Result Value Ref Range    Extra Tube Hold for add-ons.    Lavender Top Hold    Collection Time: 12/13/24  3:14 AM   Result Value Ref Range    Extra Tube Hold for add-ons.    Gold Top Hold    Collection Time: 12/13/24  3:14 AM    Result Value Ref Range    Extra Tube Hold for add-ons.    EKG 12-lead    Collection Time: 12/13/24  3:24 AM   Result Value Ref Range    QRS Duration 88 ms    [facility] QTC Calculation 482 ms   RT Blood Gas    Collection Time: 12/13/24  3:30 AM   Result Value Ref Range    Sample Type Venous Blood     Drawn by rn     pH, Blood gas 7.420 (H) 7.300 - 7.400    pCO2, Blood gas 37.0 (L) 40.0 - 50.0 mmHg    pO2, Blood gas 61.0 (H) 30.0 - 40.0 mmHg    TOC2, Blood gas 25.1 mmol/L    Base Excess, Blood gas -0.20 mmol/L    sO2, Blood gas 93.1 %    HCO3, Blood gas 24.0 mmol/L    THb, Blood gas 15.2 g/dL    O2 Hb, Blood Gas 90.2 %    CO Hgb 2.6 %    Met Hgb 0.5 %    Allens Test N/A     FIO2, Blood gas 21.0 %   Urinalysis, Reflex to Urine Culture    Collection Time: 12/13/24  5:53 AM    Specimen: Urine   Result Value Ref Range    Color, UA Yellow Yellow, Light-Yellow, Dark Yellow, Neha, Straw    Appearance, UA Clear Clear    Specific Gravity, UA 1.027 1.005 - 1.030    pH, UA 5.5 5.0 - 8.5    Protein, UA Trace (A) Negative    Glucose, UA Normal Negative, Normal    Ketones, UA 1+ (A) Negative    Blood, UA 1+ (A) Negative    Bilirubin, UA Negative Negative    Urobilinogen, UA Normal 0.2, 1.0, Normal    Nitrites, UA Negative Negative    Leukocyte Esterase, UA Negative Negative    RBC, UA 11-20 (A) None Seen, 0-2, 3-5, 0-5 /HPF    WBC, UA 0-5 None Seen, 0-2, 3-5, 0-5 /HPF    Bacteria, UA None Seen None Seen /HPF    Squamous Epithelial Cells, UA Trace (A) None Seen /HPF    Mucous, UA Few (A) None Seen /LPF    Hyaline Casts, UA None Seen None Seen /lpf   Drug Screen, Urine    Collection Time: 12/13/24  5:53 AM   Result Value Ref Range    Amphetamines, Urine Negative Negative    Barbiturates, Urine Negative Negative    Benzodiazepine, Urine Negative Negative    Cannabinoids, Urine Negative Negative    Cocaine, Urine Positive (A) Negative    Fentanyl, Urine Negative Negative    MDMA, Urine Negative Negative    Opiates, Urine Negative Negative     Phencyclidine, Urine Negative Negative    pH, Urine 5.5 3.0 - 11.0    Specific Gravity, Urine Auto 1.027 1.001 - 1.035   Salicylate Level    Collection Time: 12/13/24  5:58 AM   Result Value Ref Range    Salicylate Level <5.0 (L) 15.0 - 30.0 mg/dL   Acetaminophen Level    Collection Time: 12/13/24  5:58 AM   Result Value Ref Range    Acetaminophen Level <3.0 (L) 10.0 - 30.0 ug/ml   Comprehensive metabolic panel    Collection Time: 12/13/24  5:58 AM   Result Value Ref Range    Sodium 139 136 - 145 mmol/L    Potassium 3.9 3.5 - 5.1 mmol/L    Chloride 104 98 - 107 mmol/L    CO2 28 23 - 31 mmol/L    Glucose 91 82 - 115 mg/dL    Blood Urea Nitrogen 26.9 (H) 8.4 - 25.7 mg/dL    Creatinine 0.99 0.72 - 1.25 mg/dL    Calcium 9.4 8.8 - 10.0 mg/dL    Protein Total 7.2 5.8 - 7.6 gm/dL    Albumin 4.0 3.4 - 4.8 g/dL    Globulin 3.2 2.4 - 3.5 gm/dL    Albumin/Globulin Ratio 1.3 1.1 - 2.0 ratio    Bilirubin Total 0.6 <=1.5 mg/dL    ALP 82 40 - 150 unit/L    ALT 19 0 - 55 unit/L    AST 17 5 - 34 unit/L    eGFR >60 mL/min/1.73/m2    Anion Gap 7.0 mEq/L    BUN/Creatinine Ratio 27    Lavender Top Hold    Collection Time: 12/13/24  6:03 AM   Result Value Ref Range    Extra Tube Hold for add-ons.         No results found.       Assessment and Plan       Substance abuse   Essential hypertension

## 2024-12-14 NOTE — PROGRESS NOTES
12/14/24 1415   [facility] Group Therapy   Group Name Community Reintegration   Specific Interventions Relapse Prevention   Participation Level None   Participation Quality Sleeping;Other (see comments)  (didn't attend)

## 2024-12-15 PROCEDURE — 11400000 HC PSYCH PRIVATE ROOM

## 2024-12-15 PROCEDURE — 25000003 PHARM REV CODE 250

## 2024-12-15 PROCEDURE — 25000003 PHARM REV CODE 250: Performed by: PSYCHIATRY & NEUROLOGY

## 2024-12-15 PROCEDURE — 25000003 PHARM REV CODE 250: Performed by: PEDIATRICS

## 2024-12-15 RX ORDER — CLONIDINE HYDROCHLORIDE 0.1 MG/1
0.1 TABLET ORAL EVERY 8 HOURS PRN
Status: DISCONTINUED | OUTPATIENT
Start: 2024-12-15 | End: 2024-12-20 | Stop reason: HOSPADM

## 2024-12-15 RX ORDER — MUPIROCIN 20 MG/G
OINTMENT TOPICAL 2 TIMES DAILY
Status: DISCONTINUED | OUTPATIENT
Start: 2024-12-15 | End: 2024-12-15

## 2024-12-15 RX ADMIN — TRAZODONE HYDROCHLORIDE 100 MG: 100 TABLET ORAL at 08:12

## 2024-12-15 RX ADMIN — AMLODIPINE BESYLATE 5 MG: 5 TABLET ORAL at 09:12

## 2024-12-15 RX ADMIN — HYDROXYZINE HYDROCHLORIDE 50 MG: 50 TABLET, FILM COATED ORAL at 08:12

## 2024-12-15 RX ADMIN — CLONIDINE HYDROCHLORIDE 0.1 MG: 0.1 TABLET ORAL at 05:12

## 2024-12-15 RX ADMIN — IBUPROFEN 400 MG: 400 TABLET, FILM COATED ORAL at 08:12

## 2024-12-15 RX ADMIN — IBUPROFEN 400 MG: 400 TABLET, FILM COATED ORAL at 07:12

## 2024-12-15 RX ADMIN — BUPROPION HYDROCHLORIDE 150 MG: 150 TABLET, EXTENDED RELEASE ORAL at 09:12

## 2024-12-15 NOTE — NURSING
"PRN Medication Follow-up Note:    Behavior:    Patient (Oli Sanderson is a 64 y.o. male, : 1960, MRN: [id number])     Allergies: Patient has no known allergies.    [place]  height is 5' 9" (1.753 m) and weight is 77 kg (169 lb 12.8 oz). His oral temperature is 98.1 °F (36.7 °C). His blood pressure is 101/66 and his pulse is 98. His respiration is 18 and oxygen saturation is 100%.     Administered Ibuprofen 400 mg PO PRN per physician order to Oli       Intervention:    Intervention to Oli's response: relief of leg pain.       Response:    Oli's response: minimal relief of leg pain.      Plan:     Continue to monitor per MD/PA/APRN orders; and reevaluate medication effectiveness within 30 minutes.    "

## 2024-12-15 NOTE — NURSING
"PRN Administration Note:    Behavior:    Patient (Oli Sanderson is a 64 y.o. male, : 1960, MRN: [id number])     Allergies: Patient has no known allergies.    Oli's  height is 5' 9" (1.753 m) and weight is 77 kg (169 lb 12.8 oz). His oral temperature is 98.1 °F (36.7 °C). His blood pressure is 101/66 and his pulse is 98. His respiration is 18 and oxygen saturation is 100%.     Reason for PRN Administration: leg pain.    Intervention:    Administered Ibuprofen 400 mg PO PRN per physician order to Oli       Response:    Oli tolerated administration well.      Plan:     Continue to monitor per MD/PA/APRN orders; and reevaluate medication effectiveness within 30 minutes.    "

## 2024-12-15 NOTE — PLAN OF CARE
Problem: Adult Behavioral Health Plan of Care  Goal: Plan of Care Review  Outcome: Progressing  Flowsheets (Taken 12/15/2024 0933)  Patient Agreement with Plan of Care: agrees  Plan of Care Reviewed With: patient  Goal: Patient-Specific Goal (Individualization)  Outcome: Progressing  Flowsheets (Taken 12/15/2024 0933)  Patient Personal Strengths: independent living skills  Patient Vulnerabilities: substance abuse/addiction  Goal: Adheres to Safety Considerations for Self and Others  Outcome: Progressing  Flowsheets (Taken 12/15/2024 0933)  Adheres to Safety Considerations for Self and Others: making progress toward outcome  Intervention: Develop and Maintain Individualized Safety Plan  Flowsheets (Taken 12/15/2024 0933)  Safety Measures: safety rounds completed  Goal: Absence of New-Onset Illness or Injury  Outcome: Progressing  Intervention: Identify and Manage Fall Risk  Flowsheets (Taken 12/15/2024 0933)  Safety Measures: safety rounds completed  Intervention: Prevent VTE (Venous Thromboembolism)  Flowsheets (Taken 12/15/2024 0933)  VTE Prevention/Management: fluids promoted  Intervention: Prevent Infection  Flowsheets (Taken 12/15/2024 0933)  Infection Prevention: rest/sleep promoted  Goal: Optimized Coping Skills in Response to Life Stressors  Outcome: Progressing  Flowsheets (Taken 12/15/2024 0933)  Optimized Coping Skills in Response to Life Stressors: making progress toward outcome  Intervention: Promote Effective Coping Strategies  Flowsheets (Taken 12/15/2024 0933)  Supportive Measures: self-care encouraged  Goal: Develops/Participates in Therapeutic [place] to Support Successful Transition  Outcome: Progressing  Flowsheets (Taken 12/15/2024 0933)  Develops/Participates in Therapeutic [place] to Support Successful Transition: making progress toward outcome  Intervention: Foster Therapeutic [place]  Flowsheets (Taken 12/15/2024 0933)  Trust Relationship/Rapport: care explained  Goal: Rounds/Family  Conference  Outcome: Progressing  Flowsheets (Taken 12/15/2024 0933)  Participants:   milieu/psych techs   nursing     Problem: Violence Risk or Actual  Goal: Anger and Impulse Control  Outcome: Progressing  Intervention: Minimize Safety Risk  Flowsheets (Taken 12/15/2024 0933)  Behavior Management: behavioral plan reviewed  Sensory Stimulation Regulation: quiet environment promoted  De-Escalation Techniques: quiet time facilitated  Intervention: Promote Self-Control  Flowsheets (Taken 12/15/2024 0933)  Supportive Measures: self-care encouraged  Environmental Support: calm environment promoted     Problem: Suicide Risk  Goal: Absence of Self-Harm  Outcome: Progressing  Intervention: Assess Risk to Self and Maintain Safety  Flowsheets (Taken 12/15/2024 0933)  Behavior Management: behavioral plan reviewed  Self-Harm Prevention: environmental self-harm risks assessed  Intervention: Promote Psychosocial Wellbeing  Flowsheets (Taken 12/15/2024 0933)  Sleep/Rest Enhancement: regular sleep/rest pattern promoted  Supportive Measures: self-care encouraged  Family/Support System Care: self-care encouraged  Intervention: Establish Safety Plan and Continuity of Care  Flowsheets (Taken 12/15/2024 0933)  Safe Transition Promotion: access to lethal means addressed     Problem: Excessive Substance Use  Goal: Optimized Energy Level (Excessive Substance Use)  Outcome: Progressing  Flowsheets (Taken 12/15/2024 0933)  Mutually Determined Action Steps (Optimized Energy Level): grooms self without prompting  Intervention: Optimize Energy Level  Flowsheets (Taken 12/15/2024 0933)  Activity (Behavioral Health): up ad garth  Patient Performed Hygiene: teeth brushed  Diversional Activity: television  Goal: Improved Behavioral Control (Excessive Substance Use)  Outcome: Progressing  Flowsheets (Taken 12/15/2024 0933)  Mutually Determined Action Steps (Improved Behavioral Control): identifies major stressors  Intervention: Promote Behavior and  Impulse Control  Flowsheets (Taken 12/15/2024 0933)  Behavior Management: behavioral plan reviewed  Goal: Increased Participation and Engagement (Excessive Substance Use)  Outcome: Progressing  Flowsheets (Taken 12/15/2024 0933)  Mutually Determined Action Steps (Increased Participation and Engagement): discusses ongoing recovery plan  Intervention: Facilitate Participation and Engagement  Flowsheets (Taken 12/15/2024 0933)  Supportive Measures: self-care encouraged  Diversional Activity: television  Goal: Improved Physiologic Symptoms (Excessive Substance Use)  Outcome: Progressing  Flowsheets (Taken 12/15/2024 0933)  Mutually Determined Action Steps (Improved Physiologic Symptoms): discusses use pattern  Intervention: Optimize Physiologic Function  Flowsheets (Taken 12/15/2024 0933)  Oral Nutrition Promotion: rest periods promoted  Nutrition Interventions: food preferences provided  Goal: Enhanced Social, Occupational or Functional Skills (Excessive Substance Use)  Outcome: Progressing  Flowsheets (Taken 12/15/2024 0933)  Mutually Determined Action Steps (Enhanced Social, Occupational or Functional Skills): participates in social skills training  Intervention: Promote Social, Occupational and Functional Ability  Flowsheets (Taken 12/15/2024 0933)  Trust Relationship/Rapport: care explained  Social Functional Ability Promotion: autonomy promoted     Problem: Hypertension Acute  Goal: Blood Pressure Within Desired Range  Outcome: Progressing  Intervention: Normalize Blood Pressure  Flowsheets (Taken 12/15/2024 0933)  Sensory Stimulation Regulation: quiet environment promoted  Medication Review/Management: medications reviewed    He is AAO X 4. Endorses depression and anxiety this AM. Reports that he is experiencing leg pain. Received Ibuprofen 400 mg PO PRN for the pain. Denies SI, HI, hallucinations, and delusions. Good eye contact noted. Speech normal tone and speed. Interacts with selected patients and staff.  Continue plan of care and provide a safe and therapeutic environment. Continue to monitor every fifteen minutes for safety.

## 2024-12-15 NOTE — NURSING
2008 pt c/o restlessness, anxiety and headache rated 9/10. BP reading also elevated at 172/104. Administered PRN atarax, trazodone, APAP and clonidine 0.2mg at this time.     2200 pt noted to be resting quietly in bed with eyes closed. Respirations even/unlabored, no signs of distress or discomfort noted at this time.

## 2024-12-15 NOTE — PLAN OF CARE
Problem: Adult Behavioral Health Plan of Care  Goal: Plan of Care Review  Outcome: Progressing  Flowsheets (Taken 12/15/2024 0007)  Patient Agreement with Plan of Care: agrees  Plan of Care Reviewed With: patient  Goal: Patient-Specific Goal (Individualization)  Outcome: Progressing  Flowsheets (Taken 12/15/2024 0007)  Patient Personal Strengths: independent living skills  Patient Vulnerabilities: substance abuse/addiction  Goal: Adheres to Safety Considerations for Self and Others  Outcome: Progressing  Flowsheets (Taken 12/15/2024 0007)  Adheres to Safety Considerations for Self and Others: making progress toward outcome  Goal: Absence of New-Onset Illness or Injury  Outcome: Progressing  Intervention: Prevent VTE (Venous Thromboembolism)  Flowsheets (Taken 12/15/2024 0007)  VTE Prevention/Management: fluids promoted  Intervention: Prevent Infection  Flowsheets (Taken 12/15/2024 0007)  Infection Prevention: hand hygiene promoted  Goal: Optimized Coping Skills in Response to Life Stressors  Outcome: Progressing  Flowsheets (Taken 12/15/2024 0007)  Optimized Coping Skills in Response to Life Stressors: making progress toward outcome  Goal: Develops/Participates in Therapeutic [place] to Support Successful Transition  Outcome: Progressing  Flowsheets (Taken 12/15/2024 0007)  Develops/Participates in Therapeutic [place] to Support Successful Transition: making progress toward outcome  Goal: Rounds/Family Conference  Outcome: Progressing   He is AAO X 4. Reports anxious and depressed. Guarded. Poor eye contact noted. In dayroom, watching TV. Denies pain at present. Compliant with meds. Participating in groups. Received prn meds for anxiet, headache, and elevated blood pressure. Continue plan of care and provide a safe and therapeutic environment. Every fifteen minute rounding continued for safety.

## 2024-12-16 LAB
ALBUMIN SERPL-MCNC: 3.7 G/DL (ref 3.4–4.8)
ALBUMIN/GLOB SERPL: 1.5 RATIO (ref 1.1–2)
ALP SERPL-CCNC: 74 UNIT/L (ref 40–150)
ALT SERPL-CCNC: 15 UNIT/L (ref 0–55)
ANION GAP SERPL CALC-SCNC: 6 MEQ/L
AST SERPL-CCNC: 15 UNIT/L (ref 5–34)
BASOPHILS # BLD AUTO: 0.03 X10(3)/MCL
BASOPHILS NFR BLD AUTO: 0.6 %
BILIRUB SERPL-MCNC: 0.4 MG/DL
BUN SERPL-MCNC: 13.6 MG/DL (ref 8.4–25.7)
CALCIUM SERPL-MCNC: 8.9 MG/DL (ref 8.8–10)
CHLORIDE SERPL-SCNC: 106 MMOL/L (ref 98–107)
CHOLEST SERPL-MCNC: 169 MG/DL
CHOLEST/HDLC SERPL: 4 {RATIO} (ref 0–5)
CO2 SERPL-SCNC: 28 MMOL/L (ref 23–31)
CREAT SERPL-MCNC: 0.82 MG/DL (ref 0.72–1.25)
CREAT/UREA NIT SERPL: 17
EOSINOPHIL # BLD AUTO: 0.14 X10(3)/MCL (ref 0–0.9)
EOSINOPHIL NFR BLD AUTO: 2.7 %
ERYTHROCYTE [DISTWIDTH] IN BLOOD BY AUTOMATED COUNT: 12.7 % (ref 11.5–17)
EST. AVERAGE GLUCOSE BLD GHB EST-MCNC: 96.8 MG/DL
GFR SERPLBLD CREATININE-BSD FMLA CKD-EPI: >60 ML/MIN/1.73/M2
GLOBULIN SER-MCNC: 2.5 GM/DL (ref 2.4–3.5)
GLUCOSE SERPL-MCNC: 88 MG/DL (ref 82–115)
HBA1C MFR BLD: 5 %
HCT VFR BLD AUTO: 39.3 % (ref 42–52)
HDLC SERPL-MCNC: 42 MG/DL (ref 35–60)
HGB BLD-MCNC: 12.7 G/DL (ref 14–18)
IMM GRANULOCYTES # BLD AUTO: 0.01 X10(3)/MCL (ref 0–0.04)
IMM GRANULOCYTES NFR BLD AUTO: 0.2 %
LDLC SERPL CALC-MCNC: 107 MG/DL (ref 50–140)
LYMPHOCYTES # BLD AUTO: 2.08 X10(3)/MCL (ref 0.6–4.6)
LYMPHOCYTES NFR BLD AUTO: 40.5 %
MCH RBC QN AUTO: 30.1 PG (ref 27–31)
MCHC RBC AUTO-ENTMCNC: 32.3 G/DL (ref 33–36)
MCV RBC AUTO: 93.1 FL (ref 80–94)
MONOCYTES # BLD AUTO: 0.57 X10(3)/MCL (ref 0.1–1.3)
MONOCYTES NFR BLD AUTO: 11.1 %
NEUTROPHILS # BLD AUTO: 2.3 X10(3)/MCL (ref 2.1–9.2)
NEUTROPHILS NFR BLD AUTO: 44.9 %
NRBC BLD AUTO-RTO: 0 %
PLATELET # BLD AUTO: 267 X10(3)/MCL (ref 130–400)
PMV BLD AUTO: 9.5 FL (ref 7.4–10.4)
POTASSIUM SERPL-SCNC: 4.8 MMOL/L (ref 3.5–5.1)
PROT SERPL-MCNC: 6.2 GM/DL (ref 5.8–7.6)
RBC # BLD AUTO: 4.22 X10(6)/MCL (ref 4.7–6.1)
SODIUM SERPL-SCNC: 140 MMOL/L (ref 136–145)
T PALLIDUM AB SER QL: REACTIVE
TRIGL SERPL-MCNC: 98 MG/DL (ref 34–140)
TSH SERPL-ACNC: 2.28 UIU/ML (ref 0.35–4.94)
VLDLC SERPL CALC-MCNC: 20 MG/DL
WBC # BLD AUTO: 5.13 X10(3)/MCL (ref 4.5–11.5)

## 2024-12-16 PROCEDURE — 25000003 PHARM REV CODE 250: Performed by: PSYCHIATRY & NEUROLOGY

## 2024-12-16 PROCEDURE — 83036 HEMOGLOBIN GLYCOSYLATED A1C: CPT | Performed by: PSYCHIATRY & NEUROLOGY

## 2024-12-16 PROCEDURE — 86780 TREPONEMA PALLIDUM: CPT | Performed by: PSYCHIATRY & NEUROLOGY

## 2024-12-16 PROCEDURE — 80053 COMPREHEN METABOLIC PANEL: CPT | Performed by: PSYCHIATRY & NEUROLOGY

## 2024-12-16 PROCEDURE — 86592 SYPHILIS TEST NON-TREP QUAL: CPT | Performed by: PSYCHIATRY & NEUROLOGY

## 2024-12-16 PROCEDURE — 25000003 PHARM REV CODE 250: Performed by: PEDIATRICS

## 2024-12-16 PROCEDURE — 25000003 PHARM REV CODE 250

## 2024-12-16 PROCEDURE — 84443 ASSAY THYROID STIM HORMONE: CPT | Performed by: PSYCHIATRY & NEUROLOGY

## 2024-12-16 PROCEDURE — 11400000 HC PSYCH PRIVATE ROOM

## 2024-12-16 PROCEDURE — 80061 LIPID PANEL: CPT | Performed by: PSYCHIATRY & NEUROLOGY

## 2024-12-16 PROCEDURE — 85025 COMPLETE CBC W/AUTO DIFF WBC: CPT | Performed by: PSYCHIATRY & NEUROLOGY

## 2024-12-16 RX ORDER — DOXEPIN HYDROCHLORIDE 10 MG/1
10 CAPSULE ORAL NIGHTLY PRN
Status: DISCONTINUED | OUTPATIENT
Start: 2024-12-16 | End: 2024-12-20 | Stop reason: HOSPADM

## 2024-12-16 RX ADMIN — CLONIDINE HYDROCHLORIDE 0.2 MG: 0.1 TABLET ORAL at 11:12

## 2024-12-16 RX ADMIN — CLONIDINE HYDROCHLORIDE 0.1 MG: 0.1 TABLET ORAL at 09:12

## 2024-12-16 RX ADMIN — DOXEPIN HYDROCHLORIDE 10 MG: 10 CAPSULE ORAL at 08:12

## 2024-12-16 RX ADMIN — BUPROPION HYDROCHLORIDE 150 MG: 150 TABLET, EXTENDED RELEASE ORAL at 08:12

## 2024-12-16 RX ADMIN — AMLODIPINE BESYLATE 5 MG: 5 TABLET ORAL at 08:12

## 2024-12-16 RX ADMIN — ACETAMINOPHEN 650 MG: 325 TABLET, FILM COATED ORAL at 08:12

## 2024-12-16 RX ADMIN — HYDROXYZINE HYDROCHLORIDE 50 MG: 50 TABLET, FILM COATED ORAL at 08:12

## 2024-12-16 NOTE — PROGRESS NOTES
"12/16/2024  Oli Sanderson   1960   [id number]        Psychiatry Progress Note     Chief Complaint: "It's doing all right"    SUBJECTIVE:   Oli Sanderson is a 64 y.o. male placed under a PEC at [facility] with suicidal ideation with an attempt to OD on sleeping pills while drinking alcohol.     Today, he states that his mood is "sometimes up, sometimes down."  Reports chest pain last night with resolution after roughly 45 minutes.    He had recently been put on Depakote in November (no Depakote prior).  Took trazodone and hydroxyzine last night.  Did not sleep well.  Will discontinue trazodone and will start PRN doxepin.    He was started on Wellubutrin while in rehab recently.  Denies any previous issues with it.  States that he plans to go to rehab when he leave here.  Will make these adjustments and will monitor progress.       UDS: (+)cocaine  Blood alcohol: <10    Current Medications:   Scheduled Meds:    amLODIPine  5 mg Oral Daily    buPROPion  150 mg Oral Daily    nicotine  1 patch Transdermal Daily      PRN Meds:   Current Facility-Administered Medications:     acetaminophen, 650 mg, Oral, Q6H PRN    aluminum-magnesium hydroxide-simethicone, 30 mL, Oral, Q6H PRN    cloNIDine, 0.1 mg, Oral, Q8H PRN    cloNIDine, 0.2 mg, Oral, Q8H PRN    haloperidoL, 10 mg, Oral, Q4H PRN **AND** diphenhydrAMINE, 50 mg, Oral, Q4H PRN **AND** LORazepam, 2 mg, Oral, Q4H PRN **AND** haloperidol lactate, 10 mg, Intramuscular, Q4H PRN **AND** diphenhydrAMINE, 50 mg, Intramuscular, Q4H PRN **AND** lorazepam, 2 mg, Intramuscular, Q4H PRN    hydrOXYzine HCL, 50 mg, Oral, Q4H PRN    ibuprofen, 400 mg, Oral, Q6H PRN    traZODone, 100 mg, Oral, Nightly PRN   Psychotherapeutics (From admission, onward)      Start     Stop Route Frequency Ordered    12/14/24 1130  buPROPion TB24 tablet 150 mg         -- Oral Daily 12/14/24 1018    12/13/24 2104  traZODone tablet 100 mg         -- Oral Nightly PRN 12/13/24 2004 12/13/24 1143  haloperidoL " "tablet 10 mg  (Med - Acute  Behavioral Management)        Placed in "And" Linked Group    -- Oral Every 4 hours PRN 12/13/24 1143    12/13/24 1143  LORazepam tablet 2 mg  (Med - Acute  Behavioral Management)        Placed in "And" Linked Group    -- Oral Every 4 hours PRN 12/13/24 1143    12/13/24 1143  haloperidol lactate injection 10 mg  (Med - Acute  Behavioral Management)        Placed in "And" Linked Group    -- IM Every 4 hours PRN 12/13/24 1143    12/13/24 1143  LORazepam injection 2 mg  (Med - Acute  Behavioral Management)        Placed in "And" Linked Group    -- IM Every 4 hours PRN 12/13/24 1143            Allergies:   Review of patient's allergies indicates:  No Known Allergies     OBJECTIVE:   Vitals   Vitals:    12/16/24 0701   BP: (!) 155/89   Pulse: 76   Resp: 16   Temp: 98.2 °F (36.8 °C)        Labs/Imaging/Studies:   Recent Results (from the past 36 hours)   Hemoglobin A1C    Collection Time: 12/16/24  6:29 AM   Result Value Ref Range    Hemoglobin A1c 5.0 <=7.0 %    Estimated Average Glucose 96.8 mg/dL   Lipid Panel    Collection Time: 12/16/24  6:29 AM   Result Value Ref Range    Cholesterol Total 169 <=200 mg/dL    HDL Cholesterol 42 35 - 60 mg/dL    Triglyceride 98 34 - 140 mg/dL    Cholesterol/HDL Ratio 4 0 - 5    Very Low Density Lipoprotein 20     LDL Cholesterol 107.00 50.00 - 140.00 mg/dL   TSH    Collection Time: 12/16/24  6:29 AM   Result Value Ref Range    TSH 2.283 0.350 - 4.940 uIU/mL   Comprehensive Metabolic Panel    Collection Time: 12/16/24  6:29 AM   Result Value Ref Range    Sodium 140 136 - 145 mmol/L    Potassium 4.8 3.5 - 5.1 mmol/L    Chloride 106 98 - 107 mmol/L    CO2 28 23 - 31 mmol/L    Glucose 88 82 - 115 mg/dL    Blood Urea Nitrogen 13.6 8.4 - 25.7 mg/dL    Creatinine 0.82 0.72 - 1.25 mg/dL    Calcium 8.9 8.8 - 10.0 mg/dL    Protein Total 6.2 5.8 - 7.6 gm/dL    Albumin 3.7 3.4 - 4.8 g/dL    Globulin 2.5 2.4 - 3.5 gm/dL    Albumin/Globulin Ratio 1.5 1.1 - 2.0 ratio    " "Bilirubin Total 0.4 <=1.5 mg/dL    ALP 74 40 - 150 unit/L    ALT 15 0 - 55 unit/L    AST 15 5 - 34 unit/L    eGFR >60 mL/min/1.73/m2    Anion Gap 6.0 mEq/L    BUN/Creatinine Ratio 17    CBC with Differential    Collection Time: 12/16/24  6:29 AM   Result Value Ref Range    WBC 5.13 4.50 - 11.50 x10(3)/mcL    RBC 4.22 (L) 4.70 - 6.10 x10(6)/mcL    Hgb 12.7 (L) 14.0 - 18.0 g/dL    Hct 39.3 (L) 42.0 - 52.0 %    MCV 93.1 80.0 - 94.0 fL    MCH 30.1 27.0 - 31.0 pg    MCHC 32.3 (L) 33.0 - 36.0 g/dL    RDW 12.7 11.5 - 17.0 %    Platelet 267 130 - 400 x10(3)/mcL    MPV 9.5 7.4 - 10.4 fL    Neut % 44.9 %    Lymph % 40.5 %    Mono % 11.1 %    Eos % 2.7 %    Basophil % 0.6 %    Lymph # 2.08 0.6 - 4.6 x10(3)/mcL    Neut # 2.30 2.1 - 9.2 x10(3)/mcL    Mono # 0.57 0.1 - 1.3 x10(3)/mcL    Eos # 0.14 0 - 0.9 x10(3)/mcL    Baso # 0.03 <=0.2 x10(3)/mcL    IG# 0.01 0 - 0.04 x10(3)/mcL    IG% 0.2 %    NRBC% 0.0 %          Medical Review Of Systems:  Constitutional: negative  Respiratory: negative  Cardiovascular: chest pain  Gastrointestinal: negative  Genitourinary:negative  Musculoskeletal:negative  Neurological: negative       Psychiatric Mental Status Exam:  General Appearance: appears stated age, well-developed, well-nourished  Arousal: alert  Behavior: cooperative  Movements and Motor Activity: no abnormal involuntary movements noted  Orientation: oriented to person, place, time, and situation  Speech: normal rate, normal rhythm, normal volume, normal tone  Mood: "All right"  Affect: constricted  Thought Process: linear  Associations: intact  Thought Content and Perceptions: suicidal ideation improving, no homicidal ideation, no auditory hallucinations, no visual hallucinations, no paranoid ideation, no ideas of reference  Recent and Remote Memory: recent memory intact, remote memory intact; per interview/observation with patient  Attention and Concentration: intact, attentive to conversation; per interview/observation with " patient  Fund of Knowledge: intact, aware of current events, vocabulary appropriate; based on history, vocabulary, fund of knowledge, syntax, grammar, and content  Insight: questionable; based on understanding of severity of illness and HPI  Judgment: questionable; based on patient's behavior and HPI    ASSESSMENT/PLAN:   Problems Addressed/Diagnoses:  Major Depressive Disorder, recurrent, severe (F33.2)  Insomnia (F51.01)  Alcohol use disorder (F10.10)    Past Medical History:   Diagnosis Date    Hypertension         Plan:  Depression, chronic with acute exacerbation  -Continue Wellbutrin    Insomnia, chronic with acute exacerbation  -Change trazodone to doxepin prn    Alcohol use, chronic with acute exacerbation  -Group/Individual psychotherapy  -Will offer naltrexone vs Campral      Expected Disposition Plan: Substance treatment program        Boogie Lees M.D.

## 2024-12-16 NOTE — NURSING
2003 pt c/o leg/joint pain rated 8/10, anxiety and restlessness. Administered PRN IBU, atarax and trazodone at this time.     2200 pt noted to be resting quietly in bed with eyes closed. Respirations even/unlabored. No signs of distress or discomfort noted at this time.

## 2024-12-16 NOTE — NURSING
At, 0818, complaints of headache, PS 4/10.  Tylenol 650 mg., administered by mouth.  At 0919, verbalizes full relief from headache.

## 2024-12-16 NOTE — PLAN OF CARE
Problem: Adult Behavioral Health Plan of Care  Goal: Plan of Care Review  Outcome: Progressing  Flowsheets (Taken 12/15/2024 2312)  Patient Agreement with Plan of Care: agrees  Plan of Care Reviewed With: patient  Goal: Patient-Specific Goal (Individualization)  Outcome: Progressing  Flowsheets (Taken 12/15/2024 2312)  Patient Personal Strengths: independent living skills  Patient Vulnerabilities: substance abuse/addiction  Goal: Adheres to Safety Considerations for Self and Others  Outcome: Progressing  Flowsheets (Taken 12/15/2024 2312)  Adheres to Safety Considerations for Self and Others: making progress toward outcome  Intervention: Develop and Maintain Individualized Safety Plan  Flowsheets (Taken 12/15/2024 2312)  Safety Measures: safety rounds completed  Goal: Absence of New-Onset Illness or Injury  Outcome: Progressing  Intervention: Identify and Manage Fall Risk  Flowsheets (Taken 12/15/2024 2312)  Safety Measures: safety rounds completed  Intervention: Prevent Skin Injury  Flowsheets (Taken 12/15/2024 2312)  Device Skin Pressure Protection: adhesive use limited  Intervention: Prevent VTE (Venous Thromboembolism)  Flowsheets (Taken 12/15/2024 2312)  VTE Prevention/Management: fluids promoted  Intervention: Prevent Infection  Flowsheets (Taken 12/15/2024 2312)  Infection Prevention: hand hygiene promoted  Goal: Optimized Coping Skills in Response to Life Stressors  Outcome: Progressing  Flowsheets (Taken 12/15/2024 2312)  Optimized Coping Skills in Response to Life Stressors: making progress toward outcome  Goal: Develops/Participates in Therapeutic [place] to Support Successful Transition  Outcome: Progressing  Flowsheets (Taken 12/15/2024 2312)  Develops/Participates in Therapeutic [place] to Support Successful Transition: making progress toward outcome  Goal: Rounds/Family Conference  Outcome: Progressing   He is AAO X 4. Reports somewhat depressed but improved. Fair eye contact noted. In dayroom,  "watching TV. Reports "rheumatism" pain in back and lets. Compliant with meds. Participating in groups. Received prn meds pain. Continue plan of care and provide a safe and therapeutic environment. Every fifteen minute rounding continued for safety.  "

## 2024-12-16 NOTE — PLAN OF CARE
Problem: Violence Risk or Actual  Goal: Anger and Impulse Control  Outcome: Progressing  Intervention: Minimize Safety Risk  Flowsheets (Taken 12/16/2024 0826)  Behavior Management: behavioral plan reviewed  Sensory Stimulation Regulation: quiet environment promoted  De-Escalation Techniques: quiet time facilitated  Intervention: Promote Self-Control  Flowsheets (Taken 12/16/2024 0826)  Supportive Measures: self-care encouraged  Environmental Support: calm environment promoted     Problem: Suicide Risk  Goal: Absence of Self-Harm  Outcome: Progressing  Intervention: Assess Risk to Self and Maintain Safety  Flowsheets (Taken 12/16/2024 0826)  Behavior Management: behavioral plan reviewed  Self-Harm Prevention: environmental self-harm risks assessed  Intervention: Promote Psychosocial Wellbeing  Flowsheets (Taken 12/16/2024 0826)  Sleep/Rest Enhancement: regular sleep/rest pattern promoted  Supportive Measures: self-care encouraged  Family/Support System Care: self-care encouraged  Intervention: Establish Safety Plan and Continuity of Care  Flowsheets (Taken 12/16/2024 0826)  Safe Transition Promotion: access to lethal means addressed     Problem: Excessive Substance Use  Goal: Optimized Energy Level (Excessive Substance Use)  Outcome: Progressing  Flowsheets (Taken 12/16/2024 0826)  Mutually Determined Action Steps (Optimized Energy Level): grooms self without prompting  Intervention: Optimize Energy Level  Flowsheets (Taken 12/16/2024 0826)  Activity (Behavioral Health): up ad garth  Patient Performed Hygiene: teeth brushed  Diversional Activity: television  Goal: Improved Behavioral Control (Excessive Substance Use)  Outcome: Progressing  Flowsheets (Taken 12/16/2024 0826)  Mutually Determined Action Steps (Improved Behavioral Control): identifies major stressors  Intervention: Promote Behavior and Impulse Control  Flowsheets (Taken 12/16/2024 0826)  Behavior Management: behavioral plan reviewed  Goal: Increased  Participation and Engagement (Excessive Substance Use)  Outcome: Progressing  Flowsheets (Taken 12/16/2024 0826)  Mutually Determined Action Steps (Increased Participation and Engagement): discusses ongoing recovery plan  Intervention: Facilitate Participation and Engagement  Flowsheets (Taken 12/16/2024 0826)  Supportive Measures: self-care encouraged  Diversional Activity: television  Goal: Improved Physiologic Symptoms (Excessive Substance Use)  Outcome: Progressing  Flowsheets (Taken 12/16/2024 0826)  Mutually Determined Action Steps (Improved Physiologic Symptoms): discusses use pattern  Intervention: Optimize Physiologic Function  Flowsheets (Taken 12/16/2024 0826)  Oral Nutrition Promotion: rest periods promoted  Nutrition Interventions: food preferences provided  Goal: Enhanced Social, Occupational or Functional Skills (Excessive Substance Use)  Outcome: Progressing  Flowsheets (Taken 12/16/2024 0826)  Mutually Determined Action Steps (Enhanced Social, Occupational or Functional Skills): participates in social skills training  Intervention: Promote Social, Occupational and Functional Ability  Flowsheets (Taken 12/16/2024 0826)  Trust Relationship/Rapport: care explained  Social Functional Ability Promotion: autonomy promoted     Problem: Hypertension Acute  Goal: Blood Pressure Within Desired Range  Outcome: Progressing  Intervention: Normalize Blood Pressure  Flowsheets (Taken 12/16/2024 0826)  Sensory Stimulation Regulation: quiet environment promoted  Medication Review/Management: medications reviewed    He is AAO X 4. Endorses depression and anxiety. Denies SI, HI, hallucinations, and delusions. Good eye contact noted. Speech normal tone and speed. Interacts with selected patients and staff. Continue plan of care and provide a safe and therapeutic environment. Continue to monitor every fifteen minutes for safety.

## 2024-12-16 NOTE — CARE UPDATE
Rehab Referral    Rehab referral sent on pt's behalf to   [facility]  for post DC plans.

## 2024-12-17 LAB
OHS QRS DURATION: 88 MS
OHS QTC CALCULATION: 482 MS
RPR SER QL: NORMAL

## 2024-12-17 PROCEDURE — 25000003 PHARM REV CODE 250: Performed by: PEDIATRICS

## 2024-12-17 PROCEDURE — 11400000 HC PSYCH PRIVATE ROOM

## 2024-12-17 PROCEDURE — 25000003 PHARM REV CODE 250

## 2024-12-17 PROCEDURE — 25000003 PHARM REV CODE 250: Performed by: PSYCHIATRY & NEUROLOGY

## 2024-12-17 RX ADMIN — BUPROPION HYDROCHLORIDE 150 MG: 150 TABLET, EXTENDED RELEASE ORAL at 09:12

## 2024-12-17 RX ADMIN — HYDROXYZINE HYDROCHLORIDE 50 MG: 50 TABLET, FILM COATED ORAL at 08:12

## 2024-12-17 RX ADMIN — AMLODIPINE BESYLATE 5 MG: 5 TABLET ORAL at 09:12

## 2024-12-17 RX ADMIN — DOXEPIN HYDROCHLORIDE 10 MG: 10 CAPSULE ORAL at 08:12

## 2024-12-17 NOTE — PLAN OF CARE
Problem: Violence Risk or Actual  Goal: Anger and Impulse Control  Outcome: Progressing  Intervention: Promote Self-Control  Flowsheets (Taken 12/16/2024 2228)  Supportive Measures:   self-care encouraged   active listening utilized   self-reflection promoted   self-responsibility promoted   verbalization of feelings encouraged     Problem: Suicide Risk  Goal: Absence of Self-Harm  Outcome: Progressing  Intervention: Promote Psychosocial Wellbeing  Flowsheets (Taken 12/16/2024 2228)  Supportive Measures:   self-care encouraged   active listening utilized   self-reflection promoted   self-responsibility promoted   verbalization of feelings encouraged  Intervention: Establish Safety Plan and Continuity of Care  Flowsheets (Taken 12/16/2024 2228)  Safe Transition Promotion: access to lethal means addressed     Problem: Excessive Substance Use  Goal: Optimized Energy Level (Excessive Substance Use)  Outcome: Progressing  Flowsheets (Taken 12/16/2024 2228)  Mutually Determined Action Steps (Optimized Energy Level): grooms self without prompting  Goal: Improved Behavioral Control (Excessive Substance Use)  Outcome: Progressing  Flowsheets (Taken 12/16/2024 2228)  Mutually Determined Action Steps (Improved Behavioral Control): identifies major stressors  Goal: Increased Participation and Engagement (Excessive Substance Use)  Outcome: Progressing  Flowsheets (Taken 12/16/2024 2228)  Mutually Determined Action Steps (Increased Participation and Engagement): discusses ongoing recovery plan  Intervention: Facilitate Participation and Engagement  Flowsheets (Taken 12/16/2024 2228)  Supportive Measures:   self-care encouraged   active listening utilized   self-reflection promoted   self-responsibility promoted   verbalization of feelings encouraged  Goal: Improved Physiologic Symptoms (Excessive Substance Use)  Outcome: Progressing  Flowsheets (Taken 12/16/2024 2228)  Mutually Determined Action Steps (Improved Physiologic  Symptoms): discusses use pattern  Intervention: Optimize Physiologic Function  Flowsheets (Taken 12/16/2024 2228)  Oral Nutrition Promotion: rest periods promoted  Nutrition Interventions: food preferences provided  Goal: Enhanced Social, Occupational or Functional Skills (Excessive Substance Use)  Outcome: Progressing  Flowsheets (Taken 12/16/2024 2228)  Mutually Determined Action Steps (Enhanced Social, Occupational or Functional Skills): participates in social skills training     Problem: Hypertension Acute  Goal: Blood Pressure Within Desired Range  Outcome: Progressing  Intervention: Normalize Blood Pressure  Flowsheets (Taken 12/16/2024 2241)  Medication Review/Management: medications reviewed   He is AAO X 4. Reports somewhat depressed but improved. Fair eye contact noted. In dayroom, watching TV. Compliant with meds. Participating in groups. Received prn meds pain. Continue plan of care and provide a safe and therapeutic environment. Every fifteen minute rounding continued for safety.

## 2024-12-17 NOTE — NURSING
Patient complained of chest tightness. Denies chest pain. Denies tightness radiating. Denies any other symptoms. Appears anxious. Blood pressure was 166/92 HR 91.  Clonidine 0.1mg given by med nurse. Blood pressure rechecked and was 129/80 Hr 72. Reports chest pain resolved. Will continue to monitor

## 2024-12-17 NOTE — PROGRESS NOTES
"12/17/2024  Oli Sanderson   1960   [id number]        Psychiatry Progress Note     Chief Complaint: "It's doing all right"    SUBJECTIVE:   Oli Sanderson is a 64 y.o. male placed under a PEC at [facility] with suicidal ideation with an attempt to OD on sleeping pills while drinking alcohol.     The patient reports his mood today as alright, and staff note improvements in depressive symptoms observed last night. He had recently been started on Depakote in November, with no prior history of use, and it appears to be well-tolerated. Doxepin was initiated last night to address sleep disturbances, and the patient reports improvement in sleep quality since its initiation.    The patient continues to endorse plans to enter rehabilitation following discharge, demonstrating insight into his substance use and a readiness for further treatment.    Assessment:  The patient is showing gradual improvement in mood and sleep with the current medication regimen. His motivation to seek rehabilitation post-discharge is a positive indicator for ongoing recovery. Continued monitoring is warranted to ensure stabilization.      UDS: (+)cocaine  Blood alcohol: <10    Current Medications:   Scheduled Meds:    amLODIPine  5 mg Oral Daily    buPROPion  150 mg Oral Daily      PRN Meds:   Current Facility-Administered Medications:     acetaminophen, 650 mg, Oral, Q6H PRN    aluminum-magnesium hydroxide-simethicone, 30 mL, Oral, Q6H PRN    cloNIDine, 0.1 mg, Oral, Q8H PRN    cloNIDine, 0.2 mg, Oral, Q8H PRN    haloperidoL, 10 mg, Oral, Q4H PRN **AND** diphenhydrAMINE, 50 mg, Oral, Q4H PRN **AND** LORazepam, 2 mg, Oral, Q4H PRN **AND** haloperidol lactate, 10 mg, Intramuscular, Q4H PRN **AND** diphenhydrAMINE, 50 mg, Intramuscular, Q4H PRN **AND** lorazepam, 2 mg, Intramuscular, Q4H PRN    doxepin, 10 mg, Oral, Nightly PRN    hydrOXYzine HCL, 50 mg, Oral, Q4H PRN    ibuprofen, 400 mg, Oral, Q6H PRN   Psychotherapeutics (From admission, onward) " "     Start     Stop Route Frequency Ordered    12/16/24 0959  doxepin capsule 10 mg         -- Oral Nightly PRN 12/16/24 0900    12/14/24 1130  buPROPion TB24 tablet 150 mg         -- Oral Daily 12/14/24 1018    12/13/24 1143  haloperidoL tablet 10 mg  (Med - Acute  Behavioral Management)        Placed in "And" Linked Group    -- Oral Every 4 hours PRN 12/13/24 1143    12/13/24 1143  LORazepam tablet 2 mg  (Med - Acute  Behavioral Management)        Placed in "And" Linked Group    -- Oral Every 4 hours PRN 12/13/24 1143    12/13/24 1143  haloperidol lactate injection 10 mg  (Med - Acute  Behavioral Management)        Placed in "And" Linked Group    -- IM Every 4 hours PRN 12/13/24 1143    12/13/24 1143  LORazepam injection 2 mg  (Med - Acute  Behavioral Management)        Placed in "And" Linked Group    -- IM Every 4 hours PRN 12/13/24 1143            Allergies:   Review of patient's allergies indicates:  No Known Allergies     OBJECTIVE:   Vitals   Vitals:    12/17/24 0701   BP: (!) 147/91   Pulse: 75   Resp: 18   Temp: 98.3 °F (36.8 °C)        Labs/Imaging/Studies:   Recent Results (from the past 36 hours)   Hemoglobin A1C    Collection Time: 12/16/24  6:29 AM   Result Value Ref Range    Hemoglobin A1c 5.0 <=7.0 %    Estimated Average Glucose 96.8 mg/dL   SYPHILIS ANTIBODY (WITH REFLEX RPR)    Collection Time: 12/16/24  6:29 AM   Result Value Ref Range    Syphilis Antibody Reactive (A) Nonreactive, Equivocal   Lipid Panel    Collection Time: 12/16/24  6:29 AM   Result Value Ref Range    Cholesterol Total 169 <=200 mg/dL    HDL Cholesterol 42 35 - 60 mg/dL    Triglyceride 98 34 - 140 mg/dL    Cholesterol/HDL Ratio 4 0 - 5    Very Low Density Lipoprotein 20     LDL Cholesterol 107.00 50.00 - 140.00 mg/dL   TSH    Collection Time: 12/16/24  6:29 AM   Result Value Ref Range    TSH 2.283 0.350 - 4.940 uIU/mL   Comprehensive Metabolic Panel    Collection Time: 12/16/24  6:29 AM   Result Value Ref Range    Sodium 140 136 " "- 145 mmol/L    Potassium 4.8 3.5 - 5.1 mmol/L    Chloride 106 98 - 107 mmol/L    CO2 28 23 - 31 mmol/L    Glucose 88 82 - 115 mg/dL    Blood Urea Nitrogen 13.6 8.4 - 25.7 mg/dL    Creatinine 0.82 0.72 - 1.25 mg/dL    Calcium 8.9 8.8 - 10.0 mg/dL    Protein Total 6.2 5.8 - 7.6 gm/dL    Albumin 3.7 3.4 - 4.8 g/dL    Globulin 2.5 2.4 - 3.5 gm/dL    Albumin/Globulin Ratio 1.5 1.1 - 2.0 ratio    Bilirubin Total 0.4 <=1.5 mg/dL    ALP 74 40 - 150 unit/L    ALT 15 0 - 55 unit/L    AST 15 5 - 34 unit/L    eGFR >60 mL/min/1.73/m2    Anion Gap 6.0 mEq/L    BUN/Creatinine Ratio 17    CBC with Differential    Collection Time: 12/16/24  6:29 AM   Result Value Ref Range    WBC 5.13 4.50 - 11.50 x10(3)/mcL    RBC 4.22 (L) 4.70 - 6.10 x10(6)/mcL    Hgb 12.7 (L) 14.0 - 18.0 g/dL    Hct 39.3 (L) 42.0 - 52.0 %    MCV 93.1 80.0 - 94.0 fL    MCH 30.1 27.0 - 31.0 pg    MCHC 32.3 (L) 33.0 - 36.0 g/dL    RDW 12.7 11.5 - 17.0 %    Platelet 267 130 - 400 x10(3)/mcL    MPV 9.5 7.4 - 10.4 fL    Neut % 44.9 %    Lymph % 40.5 %    Mono % 11.1 %    Eos % 2.7 %    Basophil % 0.6 %    Lymph # 2.08 0.6 - 4.6 x10(3)/mcL    Neut # 2.30 2.1 - 9.2 x10(3)/mcL    Mono # 0.57 0.1 - 1.3 x10(3)/mcL    Eos # 0.14 0 - 0.9 x10(3)/mcL    Baso # 0.03 <=0.2 x10(3)/mcL    IG# 0.01 0 - 0.04 x10(3)/mcL    IG% 0.2 %    NRBC% 0.0 %          Medical Review Of Systems:  Constitutional: negative  Respiratory: negative  Cardiovascular: chest pain  Gastrointestinal: negative  Genitourinary:negative  Musculoskeletal:negative  Neurological: negative       Psychiatric Mental Status Exam:  General Appearance: appears stated age, well-developed, well-nourished  Arousal: alert  Behavior: cooperative  Movements and Motor Activity: no abnormal involuntary movements noted  Orientation: oriented to person, place, time, and situation  Speech: normal rate, normal rhythm, normal volume, normal tone  Mood: "All right"  Affect: constricted  Thought Process: linear  Associations: " intact  Thought Content and Perceptions: suicidal ideation improving, no homicidal ideation, no auditory hallucinations, no visual hallucinations, no paranoid ideation, no ideas of reference  Recent and Remote Memory: recent memory intact, remote memory intact; per interview/observation with patient  Attention and Concentration: intact, attentive to conversation; per interview/observation with patient  Fund of Knowledge: intact, aware of current events, vocabulary appropriate; based on history, vocabulary, fund of knowledge, syntax, grammar, and content  Insight: questionable; based on understanding of severity of illness and HPI  Judgment: questionable; based on patient's behavior and HPI    ASSESSMENT/PLAN:   Problems Addressed/Diagnoses:  Major Depressive Disorder, recurrent, severe (F33.2)  Insomnia (F51.01)  Alcohol use disorder (F10.10)    Past Medical History:   Diagnosis Date    Hypertension         Plan:  Depression, chronic with acute exacerbation  -Continue Wellbutrin    Insomnia, chronic with acute exacerbation  -Change trazodone to doxepin prn    Alcohol use, chronic with acute exacerbation  -Group/Individual psychotherapy  -Will offer naltrexone vs Campral      Expected Disposition Plan: Substance treatment program        MIRNA Kapadia-BC

## 2024-12-17 NOTE — NURSING
"   AAOx4. Well dressed and clean appearance. Speech with normal rate and tone. Steady gait. Flat affect, anxious and depressed mood.He says his mood is "alright".  Continue to complaint of anxiety.  Discussed medication changes with provider.  Denies suicidal ideations. Denies homicidal ideations Denies AVH,. Interacting well with staff and peers.  Fair eye contact. States that he is eating and sleeping well. Compliant with medications.  No agitation or aggression noted. Provide a safe and therapeutic environment.  Continue with plan of care and q 15 minute safety checks this shift.    "

## 2024-12-17 NOTE — PLAN OF CARE
Problem: Excessive Substance Use  Goal: Optimized Energy Level (Excessive Substance Use)  Outcome: Progressing  Flowsheets (Taken 12/17/2024 1149)  Mutually Determined Action Steps (Optimized Energy Level): grooms self without prompting  Intervention: Optimize Energy Level  Flowsheets (Taken 12/17/2024 1149)  Activity (Behavioral Health): up ad garth  Diversional Activity: art work  Goal: Improved Behavioral Control (Excessive Substance Use)  Outcome: Progressing  Flowsheets (Taken 12/17/2024 1149)  Mutually Determined Action Steps (Improved Behavioral Control): identifies major stressors  Intervention: Promote Behavior and Impulse Control  Flowsheets (Taken 12/17/2024 1149)  Behavior Management: behavioral plan developed  Goal: Increased Participation and Engagement (Excessive Substance Use)  Outcome: Progressing  Flowsheets (Taken 12/17/2024 1149)  Mutually Determined Action Steps (Increased Participation and Engagement): discusses ongoing recovery plan  Intervention: Facilitate Participation and Engagement  Flowsheets (Taken 12/17/2024 1149)  Supportive Measures:   self-reflection promoted   self-responsibility promoted  Diversional Activity: art work  Goal: Improved Physiologic Symptoms (Excessive Substance Use)  Outcome: Progressing  Flowsheets (Taken 12/17/2024 1149)  Mutually Determined Action Steps (Improved Physiologic Symptoms): discusses use pattern  Intervention: Optimize Physiologic Function  Flowsheets (Taken 12/17/2024 1149)  Oral Nutrition Promotion: rest periods promoted  Nutrition Interventions: food preferences provided     Problem: Excessive Substance Use  Goal: Enhanced Social, Occupational or Functional Skills (Excessive Substance Use)  Flowsheets (Taken 12/17/2024 1149)  Mutually Determined Action Steps (Enhanced Social, Occupational or Functional Skills): participates in social skills training      no

## 2024-12-17 NOTE — NURSING
2000 Pt requested PRN sleep and anxiety meds. Administered doxepin and atarax 50 mg po.       2102 Pt c/o chest tightness, /92. Administered clonidine 0.1 mg po.       2200 Pt in bed, resting quietly, no signs of anxiety noted.

## 2024-12-18 PROCEDURE — 25000003 PHARM REV CODE 250

## 2024-12-18 PROCEDURE — 25000003 PHARM REV CODE 250: Performed by: PSYCHIATRY & NEUROLOGY

## 2024-12-18 PROCEDURE — 25000003 PHARM REV CODE 250: Performed by: PEDIATRICS

## 2024-12-18 PROCEDURE — 11400000 HC PSYCH PRIVATE ROOM

## 2024-12-18 RX ADMIN — ACETAMINOPHEN 650 MG: 325 TABLET, FILM COATED ORAL at 08:12

## 2024-12-18 RX ADMIN — BUPROPION HYDROCHLORIDE 150 MG: 150 TABLET, EXTENDED RELEASE ORAL at 08:12

## 2024-12-18 RX ADMIN — HYDROXYZINE HYDROCHLORIDE 50 MG: 50 TABLET, FILM COATED ORAL at 08:12

## 2024-12-18 RX ADMIN — DOXEPIN HYDROCHLORIDE 10 MG: 10 CAPSULE ORAL at 08:12

## 2024-12-18 RX ADMIN — AMLODIPINE BESYLATE 5 MG: 5 TABLET ORAL at 08:12

## 2024-12-18 NOTE — PROGRESS NOTES
12/18/24 1500   [facility] Group Therapy   Group Name Mental Awareness   Specific Interventions Relapse Prevention   Participation Level Active   Participation Quality Cooperative   Insight/Motivation Good   Affect/Mood Display Appropriate   Cognition Alert   Psychomotor WNL

## 2024-12-18 NOTE — NURSING
Treatment Team Note:      Behavior:    Patient (Oli Sanderson is a 64 y.o. male, : 1960, MRN: [id number]) demonstrating an affect that was flat and anxious. Oli demonstrating mood that is depressed and anxious. Oli had an appearance that was clean. Oli denies suicidal ideation. Oli denies suicide plan. Oli denies hallucinations.  Patient has plans to go to rehab after discharge.      Intervention:    Encourage Oli to perform self-hygiene, grooming, and changing of clothing. Encourage Oli to attend all scheduled groups. Monitor Oli's behavior and program compliance. Monitor Oli for suicidal ideation, homicidal ideation, sleep disturbance, and hallucinations. Encourage Oli to eat all portions of meals and assess for meal preferences. Monitor Oli for intake and output to ensure hydration. Notify the Physician/Physician Assistant/Advance Practice Registered Nurse (MD/PA/APRN) for any medication refusal and any change in patient condition.    Discussed with Oli course of treatment. Discussed with Oli medications ordered and schedule of medications. Discussed collateral contact with Oli.      Response:    Oli's response to treatment team meeting Open to treatment..      Plan:     Continue to monitor per MD/PA/APRN orders; maintain patient safety.

## 2024-12-18 NOTE — PLAN OF CARE
Problem: Violence Risk or Actual  Goal: Anger and Impulse Control  Outcome: Progressing     Problem: Suicide Risk  Goal: Absence of Self-Harm  Outcome: Progressing     Problem: Excessive Substance Use  Goal: Optimized Energy Level (Excessive Substance Use)  Outcome: Progressing  Goal: Improved Behavioral Control (Excessive Substance Use)  Outcome: Progressing  Goal: Increased Participation and Engagement (Excessive Substance Use)  Outcome: Progressing  Goal: Improved Physiologic Symptoms (Excessive Substance Use)  Outcome: Progressing     Problem: Hypertension Acute  Goal: Blood Pressure Within Desired Range  Outcome: Progressing     Oli is AAO x 4. His appearance, speech, and gait are within normal limitations. He was observed as calm and cooperative. He denies hallucinations, delusions, suicidal ideations and homicidal ideations. Oli said, Today was good. No agitation or aggression observed. No sleep disturbances or eating problems reported.

## 2024-12-18 NOTE — NURSING
At 0844, complaints of leg and back pain, PS 8/10.  Tylenol 650 mg., administered oral medication.  At 0944, voices some relief from discomfort, PS 6/10.

## 2024-12-18 NOTE — NURSING
2005 Pt requested PRN sleep and anxiety medication. Administered doxepin 10 mg po and atarax 50 mg po.       2205 Pt in bed, eyes closed, resting quietly. No signs of anxiety noted @ this time.

## 2024-12-18 NOTE — PLAN OF CARE
Problem: Excessive Substance Use  Goal: Optimized Energy Level (Excessive Substance Use)  Outcome: Progressing  Flowsheets (Taken 12/18/2024 1618)  Mutually Determined Action Steps (Optimized Energy Level): grooms self without prompting  Intervention: Optimize Energy Level  Flowsheets (Taken 12/18/2024 1618)  Diversional Activity: television  Goal: Improved Behavioral Control (Excessive Substance Use)  Outcome: Progressing  Flowsheets (Taken 12/18/2024 1618)  Mutually Determined Action Steps (Improved Behavioral Control): identifies major stressors  Intervention: Promote Behavior and Impulse Control  Flowsheets (Taken 12/18/2024 1618)  Behavior Management: behavioral plan developed  Goal: Increased Participation and Engagement (Excessive Substance Use)  Outcome: Progressing  Flowsheets (Taken 12/18/2024 1618)  Mutually Determined Action Steps (Increased Participation and Engagement): discusses ongoing recovery plan  Intervention: Facilitate Participation and Engagement  Flowsheets (Taken 12/18/2024 1618)  Supportive Measures:   self-care encouraged   self-reflection promoted  Diversional Activity: television  Goal: Improved Physiologic Symptoms (Excessive Substance Use)  Outcome: Progressing  Flowsheets (Taken 12/18/2024 1618)  Mutually Determined Action Steps (Improved Physiologic Symptoms): discusses use pattern     Problem: Excessive Substance Use  Goal: Enhanced Social, Occupational or Functional Skills (Excessive Substance Use)  Flowsheets (Taken 12/18/2024 1618)  Mutually Determined Action Steps (Enhanced Social, Occupational or Functional Skills): participates in social skills training  Intervention: Promote Social, Occupational and Functional Ability  Flowsheets (Taken 12/18/2024 1618)  Trust Relationship/Rapport:   care explained   questions answered   questions encouraged  Social Functional Ability Promotion: autonomy promoted

## 2024-12-18 NOTE — PROGRESS NOTES
"12/18/2024  Oli Sanderson   1960   [id number]        Psychiatry Progress Note     Chief Complaint: "It's going pretty good"    SUBJECTIVE:   Oli Sanderson is a 64 y.o. male placed under a PEC at [facility] with suicidal ideation with an attempt to OD on sleeping pills while drinking alcohol.     Attending and participating in groups per staff.  Staff working on placement at [facility].  Reports that his mood has been improving during his stay.  Tolerating current medication regimen without issues.  Suicidal ideation improving.  No overt behavioral issues reported by staff.       UDS: (+)cocaine  Blood alcohol: <10    Current Medications:   Scheduled Meds:    amLODIPine  5 mg Oral Daily    buPROPion  150 mg Oral Daily      PRN Meds:   Current Facility-Administered Medications:     acetaminophen, 650 mg, Oral, Q6H PRN    aluminum-magnesium hydroxide-simethicone, 30 mL, Oral, Q6H PRN    cloNIDine, 0.1 mg, Oral, Q8H PRN    cloNIDine, 0.2 mg, Oral, Q8H PRN    haloperidoL, 10 mg, Oral, Q4H PRN **AND** diphenhydrAMINE, 50 mg, Oral, Q4H PRN **AND** LORazepam, 2 mg, Oral, Q4H PRN **AND** haloperidol lactate, 10 mg, Intramuscular, Q4H PRN **AND** diphenhydrAMINE, 50 mg, Intramuscular, Q4H PRN **AND** lorazepam, 2 mg, Intramuscular, Q4H PRN    doxepin, 10 mg, Oral, Nightly PRN    hydrOXYzine HCL, 50 mg, Oral, Q4H PRN    ibuprofen, 400 mg, Oral, Q6H PRN   Psychotherapeutics (From admission, onward)      Start     Stop Route Frequency Ordered    12/16/24 0959  doxepin capsule 10 mg         -- Oral Nightly PRN 12/16/24 0900    12/14/24 1130  buPROPion TB24 tablet 150 mg         -- Oral Daily 12/14/24 1018    12/13/24 1143  haloperidoL tablet 10 mg  (Med - Acute  Behavioral Management)        Placed in "And" Linked Group    -- Oral Every 4 hours PRN 12/13/24 1143    12/13/24 1143  LORazepam tablet 2 mg  (Med - Acute  Behavioral Management)        Placed in "And" Linked Group    -- Oral Every 4 hours PRN 12/13/24 1144    " "12/13/24 1143  haloperidol lactate injection 10 mg  (Med - Acute  Behavioral Management)        Placed in "And" Linked Group    -- IM Every 4 hours PRN 12/13/24 1143    12/13/24 1143  LORazepam injection 2 mg  (Med - Acute  Behavioral Management)        Placed in "And" Linked Group    -- IM Every 4 hours PRN 12/13/24 1143            Allergies:   Review of patient's allergies indicates:  No Known Allergies     OBJECTIVE:   Vitals   Vitals:    12/18/24 0701   BP: (!) 169/107   Pulse: 86   Resp: 18   Temp: 97.7 °F (36.5 °C)        Labs/Imaging/Studies:   No results found for this or any previous visit (from the past 36 hours).         Medical Review Of Systems:  Constitutional: negative  Respiratory: negative  Cardiovascular: chest pain  Gastrointestinal: negative  Genitourinary:negative  Musculoskeletal:negative  Neurological: negative       Psychiatric Mental Status Exam:  General Appearance: appears stated age, well-developed, well-nourished  Arousal: alert  Behavior: cooperative  Movements and Motor Activity: no abnormal involuntary movements noted  Orientation: oriented to person, place, time, and situation  Speech: normal rate, normal rhythm, normal volume, normal tone  Mood: "Pretty good"  Affect: constricted  Thought Process: linear  Associations: intact  Thought Content and Perceptions: suicidal ideation improving, no homicidal ideation, no auditory hallucinations, no visual hallucinations, no paranoid ideation, no ideas of reference  Recent and Remote Memory: recent memory intact, remote memory intact; per interview/observation with patient  Attention and Concentration: intact, attentive to conversation; per interview/observation with patient  Fund of Knowledge: intact, aware of current events, vocabulary appropriate; based on history, vocabulary, fund of knowledge, syntax, grammar, and content  Insight: questionable; based on understanding of severity of illness and HPI  Judgment: questionable; based on " patient's behavior and HPI    ASSESSMENT/PLAN:   Problems Addressed/Diagnoses:  Major Depressive Disorder, recurrent, severe (F33.2)  Insomnia (F51.01)  Alcohol use disorder (F10.10)    Past Medical History:   Diagnosis Date    Hypertension         Plan:  Depression, chronic with acute exacerbation  -Continue Wellbutrin    Insomnia, chronic with acute exacerbation  -Change doxepin PRN    Alcohol use, chronic with acute exacerbation  -Group/Individual psychotherapy      Expected Disposition Plan: Substance treatment program        Boogie Lees M.D.

## 2024-12-18 NOTE — CARE UPDATE
Treatment Team  Pt seen for treatment team today with interdisciplinary team. Pt was calm and cooperative with appropriate thought process with Tx team. Pt verbalized understanding of care plan and agreed to being discharged to [facility].

## 2024-12-19 PROBLEM — F32.A DEPRESSION: Status: ACTIVE | Noted: 2024-12-19

## 2024-12-19 LAB — T PALLIDUM AB SER QL AGGL: POSITIVE

## 2024-12-19 PROCEDURE — 25000003 PHARM REV CODE 250

## 2024-12-19 PROCEDURE — 25000003 PHARM REV CODE 250: Performed by: PEDIATRICS

## 2024-12-19 PROCEDURE — 25000003 PHARM REV CODE 250: Performed by: PSYCHIATRY & NEUROLOGY

## 2024-12-19 PROCEDURE — 11400000 HC PSYCH PRIVATE ROOM

## 2024-12-19 RX ORDER — AMLODIPINE BESYLATE 5 MG/1
5 TABLET ORAL DAILY
Qty: 30 TABLET | Refills: 0 | Status: SHIPPED | OUTPATIENT
Start: 2024-12-19 | End: 2025-01-18

## 2024-12-19 RX ORDER — BUPROPION HYDROCHLORIDE 150 MG/1
150 TABLET ORAL DAILY
Qty: 30 TABLET | Refills: 0 | Status: SHIPPED | OUTPATIENT
Start: 2024-12-20 | End: 2025-01-19

## 2024-12-19 RX ADMIN — AMLODIPINE BESYLATE 5 MG: 5 TABLET ORAL at 08:12

## 2024-12-19 RX ADMIN — ACETAMINOPHEN 650 MG: 325 TABLET, FILM COATED ORAL at 08:12

## 2024-12-19 RX ADMIN — BUPROPION HYDROCHLORIDE 150 MG: 150 TABLET, EXTENDED RELEASE ORAL at 08:12

## 2024-12-19 RX ADMIN — DOXEPIN HYDROCHLORIDE 10 MG: 10 CAPSULE ORAL at 08:12

## 2024-12-19 RX ADMIN — CLONIDINE HYDROCHLORIDE 0.2 MG: 0.1 TABLET ORAL at 07:12

## 2024-12-19 RX ADMIN — HYDROXYZINE HYDROCHLORIDE 50 MG: 50 TABLET, FILM COATED ORAL at 08:12

## 2024-12-19 RX ADMIN — ALUMINUM HYDROXIDE, MAGNESIUM HYDROXIDE, AND SIMETHICONE 30 ML: 1200; 120; 1200 SUSPENSION ORAL at 10:12

## 2024-12-19 NOTE — NURSING
2004 Pt requested PRN sleep and PRN anxiety medications. Administered doxepin 10 mg po and atarax 50 mg po.       2200 Pt in bed, eyes closed, resting quietly with no signs of anxiety noted.

## 2024-12-19 NOTE — NURSING
At 1040, complaints of indigestion.  Administered 30 ml, antiacid orally.  Patient now verbalizes no further indigestion.

## 2024-12-19 NOTE — NURSING
At 0837, complaints of back pain, PS 8/10.  Tylenol 650 mg., administered orally.  At 0937, verbalizes decreasing pain, PS 5/10.

## 2024-12-19 NOTE — PROGRESS NOTES
"12/19/2024  Oli Sanderson   1960   [id number]        Psychiatry Progress Note     Chief Complaint: "It's going pretty good"    SUBJECTIVE:   Oli Sanderson is a 64 y.o. male placed under a PEC at [facility] with suicidal ideation with an attempt to OD on sleeping pills while drinking alcohol.     Today patient states that he is feeling better. Attending and participating in groups per staff. Patient was accepted to [facility] for rehab. Reports that his mood has continued to improve during his stay. Denies suicidal ideation. Tolerating current medication regimen without issues.  No overt behavioral issues reported by staff. Will continue with current POC and proceed with planned discharge to rehab tomorrow.       UDS: (+)cocaine  Blood alcohol: <10    Current Medications:   Scheduled Meds:    amLODIPine  5 mg Oral Daily    buPROPion  150 mg Oral Daily      PRN Meds:   Current Facility-Administered Medications:     acetaminophen, 650 mg, Oral, Q6H PRN    aluminum-magnesium hydroxide-simethicone, 30 mL, Oral, Q6H PRN    cloNIDine, 0.1 mg, Oral, Q8H PRN    cloNIDine, 0.2 mg, Oral, Q8H PRN    haloperidoL, 10 mg, Oral, Q4H PRN **AND** diphenhydrAMINE, 50 mg, Oral, Q4H PRN **AND** LORazepam, 2 mg, Oral, Q4H PRN **AND** haloperidol lactate, 10 mg, Intramuscular, Q4H PRN **AND** diphenhydrAMINE, 50 mg, Intramuscular, Q4H PRN **AND** lorazepam, 2 mg, Intramuscular, Q4H PRN    doxepin, 10 mg, Oral, Nightly PRN    hydrOXYzine HCL, 50 mg, Oral, Q4H PRN    ibuprofen, 400 mg, Oral, Q6H PRN   Psychotherapeutics (From admission, onward)      Start     Stop Route Frequency Ordered    12/16/24 0959  doxepin capsule 10 mg         -- Oral Nightly PRN 12/16/24 0900    12/14/24 1130  buPROPion TB24 tablet 150 mg         -- Oral Daily 12/14/24 1018    12/13/24 1143  haloperidoL tablet 10 mg  (Med - Acute  Behavioral Management)        Placed in "And" Linked Group    -- Oral Every 4 hours PRN 12/13/24 1143    12/13/24 1143  LORazepam " "tablet 2 mg  (Med - Acute  Behavioral Management)        Placed in "And" Linked Group    -- Oral Every 4 hours PRN 12/13/24 1143    12/13/24 1143  haloperidol lactate injection 10 mg  (Med - Acute  Behavioral Management)        Placed in "And" Linked Group    -- IM Every 4 hours PRN 12/13/24 1143    12/13/24 1143  LORazepam injection 2 mg  (Med - Acute  Behavioral Management)        Placed in "And" Linked Group    -- IM Every 4 hours PRN 12/13/24 1143            Allergies:   Review of patient's allergies indicates:  No Known Allergies     OBJECTIVE:   Vitals   Vitals:    12/18/24 0701   BP: (!) 169/107   Pulse: 86   Resp: 18   Temp: 97.7 °F (36.5 °C)        Labs/Imaging/Studies:   No results found for this or any previous visit (from the past 36 hours).         Medical Review Of Systems:  Constitutional: negative  Respiratory: negative  Cardiovascular: chest pain  Gastrointestinal: negative  Genitourinary:negative  Musculoskeletal:negative  Neurological: negative       Psychiatric Mental Status Exam:  General Appearance: appears stated age, well-developed, well-nourished  Arousal: alert  Behavior: cooperative  Movements and Motor Activity: no abnormal involuntary movements noted  Orientation: oriented to person, place, time, and situation  Speech: normal rate, normal rhythm, normal volume, normal tone  Mood: "Pretty good"  Affect: constricted  Thought Process: linear  Associations: intact  Thought Content and Perceptions: denies suicidal ideation, no homicidal ideation, no auditory hallucinations, no visual hallucinations, no paranoid ideation, no ideas of reference  Recent and Remote Memory: recent memory intact, remote memory intact; per interview/observation with patient  Attention and Concentration: intact, attentive to conversation; per interview/observation with patient  Fund of Knowledge: intact, aware of current events, vocabulary appropriate; based on history, vocabulary, fund of knowledge, syntax, grammar, " and content  Insight: questionable; based on understanding of severity of illness and HPI  Judgment: questionable; based on patient's behavior and HPI    ASSESSMENT/PLAN:   Problems Addressed/Diagnoses:  Major Depressive Disorder, recurrent, severe (F33.2)  Insomnia (F51.01)  Alcohol use disorder (F10.10)    Past Medical History:   Diagnosis Date    Hypertension         Plan:  Depression, chronic with acute exacerbation  -Continue Wellbutrin    Insomnia, chronic with acute exacerbation  -Continue doxepin PRN    Alcohol use, chronic with acute exacerbation  -Group/Individual psychotherapy      Expected Disposition Plan: Substance treatment program        GRADY KapadiaP-BC

## 2024-12-19 NOTE — PLAN OF CARE
Problem: Suicide Risk  Goal: Absence of Self-Harm  Outcome: Progressing  Intervention: Assess Risk to Self and Maintain Safety  Flowsheets (Taken 12/19/2024 0759)  Behavior Management: behavioral plan reviewed  Self-Harm Prevention: environmental self-harm risks assessed  Intervention: Promote Psychosocial Wellbeing  Flowsheets (Taken 12/19/2024 0759)  Sleep/Rest Enhancement: regular sleep/rest pattern promoted  Supportive Measures: self-care encouraged  Family/Support System Care: self-care encouraged  Intervention: Establish Safety Plan and Continuity of Care  Flowsheets (Taken 12/19/2024 0759)  Safe Transition Promotion: access to lethal means addressed     Problem: Excessive Substance Use  Goal: Optimized Energy Level (Excessive Substance Use)  Outcome: Progressing  Flowsheets (Taken 12/19/2024 0759)  Mutually Determined Action Steps (Optimized Energy Level): grooms self without prompting  Intervention: Optimize Energy Level  Flowsheets (Taken 12/19/2024 0759)  Activity (Behavioral Health): up ad garth  Patient Performed Hygiene: teeth brushed  Diversional Activity: television  Goal: Improved Behavioral Control (Excessive Substance Use)  Outcome: Progressing  Flowsheets (Taken 12/19/2024 0759)  Mutually Determined Action Steps (Improved Behavioral Control): identifies major stressors  Intervention: Promote Behavior and Impulse Control  Flowsheets (Taken 12/19/2024 0759)  Behavior Management: behavioral plan reviewed  Goal: Increased Participation and Engagement (Excessive Substance Use)  Outcome: Progressing  Flowsheets (Taken 12/19/2024 0759)  Mutually Determined Action Steps (Increased Participation and Engagement): discusses ongoing recovery plan  Intervention: Facilitate Participation and Engagement  Flowsheets (Taken 12/19/2024 0759)  Supportive Measures: self-care encouraged  Diversional Activity: television  Goal: Improved Physiologic Symptoms (Excessive Substance Use)  Outcome: Progressing  Flowsheets  (Taken 12/19/2024 0759)  Mutually Determined Action Steps (Improved Physiologic Symptoms): discusses use pattern  Intervention: Optimize Physiologic Function  Flowsheets (Taken 12/19/2024 0759)  Oral Nutrition Promotion: rest periods promoted  Nutrition Interventions: food preferences provided  Goal: Enhanced Social, Occupational or Functional Skills (Excessive Substance Use)  Outcome: Progressing  Flowsheets (Taken 12/19/2024 0759)  Mutually Determined Action Steps (Enhanced Social, Occupational or Functional Skills): participates in social skills training  Intervention: Promote Social, Occupational and Functional Ability  Flowsheets (Taken 12/19/2024 0759)  Trust Relationship/Rapport: care explained  Social Functional Ability Promotion: autonomy promoted     Problem: Hypertension Acute  Goal: Blood Pressure Within Desired Range  Outcome: Progressing  Intervention: Normalize Blood Pressure  Flowsheets (Taken 12/19/2024 0759)  Sensory Stimulation Regulation: quiet environment promoted  Medication Review/Management: medications reviewed    He is AAO X 4. Denies SI, HI, hallucinations, and delusions. Endorses anxiety this AM. Good eye contact noted. Speech normal tone and speed. Interacts with selected patients and staff. Continue plan of care and provide a safe and therapeutic environment. Continue to monitor every fifteen minute checks for safety.

## 2024-12-19 NOTE — PLAN OF CARE
Problem: Suicide Risk  Goal: Absence of Self-Harm  Outcome: Progressing  Intervention: Assess Risk to Self and Maintain Safety  Flowsheets (Taken 12/18/2024 1922)  Behavior Management: behavioral plan reviewed  Enhanced Safety Measures: monitored by video  Self-Harm Prevention: environmental self-harm risks assessed  Intervention: Promote Psychosocial Wellbeing  Flowsheets (Taken 12/18/2024 1922)  Sleep/Rest Enhancement: awakenings minimized  Supportive Measures: self-care encouraged  Family/Support System Care: self-care encouraged  Intervention: Establish Safety Plan and Continuity of Care  Flowsheets (Taken 12/18/2024 1922)  Safe Transition Promotion: access to lethal means addressed     Problem: Excessive Substance Use  Goal: Optimized Energy Level (Excessive Substance Use)  Outcome: Progressing  Flowsheets (Taken 12/18/2024 1922)  Mutually Determined Action Steps (Optimized Energy Level): grooms self without prompting  Intervention: Optimize Energy Level  Flowsheets (Taken 12/18/2024 1922)  Activity (Behavioral Health): up ad garth  Diversional Activity: television  Goal: Improved Behavioral Control (Excessive Substance Use)  Outcome: Progressing  Flowsheets (Taken 12/18/2024 1922)  Mutually Determined Action Steps (Improved Behavioral Control): identifies major stressors  Intervention: Promote Behavior and Impulse Control  Flowsheets (Taken 12/18/2024 1922)  Behavior Management: behavioral plan reviewed  Goal: Increased Participation and Engagement (Excessive Substance Use)  Outcome: Progressing  Flowsheets (Taken 12/18/2024 1922)  Mutually Determined Action Steps (Increased Participation and Engagement): discusses ongoing recovery plan  Intervention: Facilitate Participation and Engagement  Flowsheets (Taken 12/18/2024 1922)  Supportive Measures: self-care encouraged  Diversional Activity: television  Goal: Improved Physiologic Symptoms (Excessive Substance Use)  Outcome: Progressing  Flowsheets (Taken  12/18/2024 1922)  Mutually Determined Action Steps (Improved Physiologic Symptoms): discusses use pattern  Intervention: Optimize Physiologic Function  Flowsheets (Taken 12/18/2024 1922)  Oral Nutrition Promotion:   physical activity promoted   social interaction promoted  Goal: Enhanced Social, Occupational or Functional Skills (Excessive Substance Use)  Outcome: Progressing  Flowsheets (Taken 12/18/2024 1922)  Mutually Determined Action Steps (Enhanced Social, Occupational or Functional Skills): participates in social skills training  Intervention: Promote Social, Occupational and Functional Ability  Flowsheets (Taken 12/18/2024 1922)  Trust Relationship/Rapport:   care explained   questions encouraged   reassurance provided  Social Functional Ability Promotion:   autonomy promoted   social interaction promoted     Problem: Hypertension Acute  Goal: Blood Pressure Within Desired Range  Outcome: Progressing     AAOx4 Pt denies SI/HI/AVH. Pt endorses having anxiety and depression, reports good sleep and good appetite. Pt has good eye contact and affect is appropriate. Pt focused on DC to recovery Friday. Q15 min safety checks continued.

## 2024-12-20 VITALS
RESPIRATION RATE: 18 BRPM | DIASTOLIC BLOOD PRESSURE: 92 MMHG | OXYGEN SATURATION: 95 % | TEMPERATURE: 98 F | HEIGHT: 69 IN | WEIGHT: 169.81 LBS | SYSTOLIC BLOOD PRESSURE: 153 MMHG | BODY MASS INDEX: 25.15 KG/M2 | HEART RATE: 77 BPM

## 2024-12-20 PROCEDURE — 25000003 PHARM REV CODE 250: Performed by: PEDIATRICS

## 2024-12-20 PROCEDURE — 25000003 PHARM REV CODE 250

## 2024-12-20 RX ADMIN — AMLODIPINE BESYLATE 5 MG: 5 TABLET ORAL at 08:12

## 2024-12-20 RX ADMIN — BUPROPION HYDROCHLORIDE 150 MG: 150 TABLET, EXTENDED RELEASE ORAL at 08:12

## 2024-12-20 NOTE — PLAN OF CARE
Problem: Suicide Risk  Goal: Absence of Self-Harm  Outcome: Met     Problem: Excessive Substance Use  Goal: Optimized Energy Level (Excessive Substance Use)  Outcome: Met  Goal: Improved Behavioral Control (Excessive Substance Use)  Outcome: Met  Goal: Increased Participation and Engagement (Excessive Substance Use)  Outcome: Met  Goal: Improved Physiologic Symptoms (Excessive Substance Use)  Outcome: Met  Goal: Enhanced Social, Occupational or Functional Skills (Excessive Substance Use)  Outcome: Met     Problem: Hypertension Acute  Goal: Blood Pressure Within Desired Range  Outcome: Met     AAOx4 Pt denies SI/HI/AVH. Pt endorses having anxiety and depression, reports good sleep and good appetite. Pt has good eye contact and affect is appropriate. Q15 min safety checks continued. BP remains elevated, treated with 0.2 mg Clonidine PRN.

## 2024-12-20 NOTE — NURSING
2030 Tylenol 650mg given for c/o left sided back pain, Doxepin 10mg given for c/o difficulty sleeping, and  Atarax 50mg given for c/o anxiety.      2130 Meds effective. Resting quietly with eyes closed.    flu vaccine 2017

## 2024-12-20 NOTE — NURSING
Discharge Note:    Oli Sanderson is a 64 y.o. male, : 1960, MRN: [id number], admitted on 2024 for Boogie Lees MD with a diagnosis of Depression [F32.A].    Patient discharged on 2024 per physician orders in stable condition. Patient denied suicidal ideation, homicidal ideation, or hallucinations. Patient was discharged with valuables, personal belongings, prescriptions, discharge instructions, printed Warning Sings of Self-Harm pursuant to Act 737 and, an educational handout explaining the diagnosis and prescribed medications. Patient verbalized understanding of the discharge instructions and importance of follow-up visits. Patient was escorted out of the facility by [facility] and placed into a secure transport vehicle to be transported to rehab.     Patient discharged on the following medications:     Medication List        START taking these medications      buPROPion 150 MG TB24 tablet  Commonly known as: WELLBUTRIN XL  Take 1 tablet (150 mg total) by mouth once daily.            CONTINUE taking these medications      amLODIPine 5 MG tablet  Commonly known as: NORVASC  Take 1 tablet (5 mg total) by mouth once daily.            STOP taking these medications      cloNIDine 0.1 MG tablet  Commonly known as: CATAPRES     EScitalopram oxalate 20 MG tablet  Commonly known as: LEXAPRO     lisinopriL 10 MG tablet     metoprolol succinate 25 MG 24 hr tablet  Commonly known as: TOPROL-XL               Where to Get Your Medications        You can get these medications from any pharmacy    Bring a paper prescription for each of these medications  amLODIPine 5 MG tablet  buPROPion 150 MG TB24 tablet

## 2024-12-20 NOTE — CARE UPDATE
Transportation has been set up via [facility]. Patient is going to [address] [place] [address]

## 2024-12-22 PROBLEM — F51.01 PRIMARY INSOMNIA: Status: ACTIVE | Noted: 2024-12-22

## 2024-12-22 NOTE — DISCHARGE SUMMARY
"DISCHARGE SUMMARY  PSYCHIATRY      Admit Date: 12/13/2024 10:03 AM    Discharge Date:  12/20/2024    SITE:   [facility]    Discharge Attending Physician: Boogie Lees M.D.    Chief Complaint:  Depressed    History of Present Illness On Admit:   Oli Sanderson is a 64 y.o. male placed under a PEC at [facility] with suicidal ideation with an attempt to OD on sleeping pills while drinking alcohol.      The patient has an extensive history of substance use and depression, presenting after a relapse and endorsing depressive symptoms and anxiety.    The patient was last admitted in May for a similar presentation involving an attempted overdose on OTC sleeping medication. He completed a 28-day rehabilitation program at that time and maintained sobriety for a couple of months. However, he has since relapsed, initially on alcohol and subsequently on cocaine, attributing the relapse to financial and life stressors, including issues such as home repairs he could not afford.     The patient reports positive results from a recent medication change to Wellbutrin, stating that when sober and consistent with his medication, he felt "OK." However, he stopped attending meetings, which likely contributed to his relapse. The patient expresses interest in returning to rehab post-discharge to address his substance use.  He continues to endorse ongoing depression and anxiety but denies acute suicidal ideations during this exam.     Assessment:  The patients relapse and increased depressive symptoms highlight the need for continued stabilization and a focus on addressing both his mood disorder and substance use. His insight into the benefits of Wellbutrin and interest in rehabilitation indicate readiness to re-engage in treatment.      Admit Mental Status Exam:  General Appearance: appears stated age, well developed and nourished, adequately groomed and appropriately dressed, in no acute " "distress  Arousal: alert with clear sensorium  Behavior: normal; cooperative; reasonably friendly, pleasant, and polite; appropriate eye-contact; under good behavioral control  Movements and Motor Activity: no abnormal involuntary movements noted; no tics, no tremors, no akathisia, no dystonia, no evidence of tardive dyskinesia; no psychomotor agitation or retardation  Orientation: intact; oriented fully to person, place, time and situation  Speech: intact; normal rate, rhythm, volume, tone and pitch; conversational, spontaneous, and coherent  Mood: Depressed  Affect: constricted  Thought Process: intact, linear, goal-directed, organized, logical  Associations: intact, no loosening of associations  Thought Content and Perceptions: recent suicidal ideation, no homicidal ideation, no auditory or visual hallucinations, no paranoid ideation, no ideas of reference, no evidence of delusions or psychosis  Recent and Remote Memory: grossly intact, able to recall relevant and salient information from the recent and remote past; per interview/observation with patient  Attention and Concentration: intact; per interview/observation with patient  Fund of Knowledge: intact; based on history, vocabulary, fund of knowledge, syntax, grammar, and content  Insight: intact; based on understanding of severity of illness and HPI  Judgment: intact; based on patient's behavior and HPI      Diagnoses:  PRINCIPAL PROBLEM:  Major depressive disorder, recurrent, severe without psychotic features      PROBLEM LIST    Major depressive disorder, recurrent, severe without psychotic features    Alcohol abuse, continuous    Primary insomnia        Hospital Course:   Patient was admitted to [facility] and continued on Wellbutrin.    12/16/24  Today, he states that his mood is "sometimes up, sometimes down."  Reports chest pain last night with resolution after roughly 45 minutes.     He had recently been put on Depakote in November (no Depakote prior).  " Took trazodone and hydroxyzine last night.  Did not sleep well.  Will discontinue trazodone and will start PRN doxepin.     He was started on Wellubutrin while in rehab recently.  Denies any previous issues with it.  States that he plans to go to rehab when he leave here.  Will make these adjustments and will monitor progress.         UDS: (+)cocaine  Blood alcohol: <10      12/17/24  The patient reports his mood today as alright, and staff note improvements in depressive symptoms observed last night. He had recently been started on Depakote in November, with no prior history of use, and it appears to be well-tolerated. Doxepin was initiated last night to address sleep disturbances, and the patient reports improvement in sleep quality since its initiation.     The patient continues to endorse plans to enter rehabilitation following discharge, demonstrating insight into his substance use and a readiness for further treatment.      12/18/24  Attending and participating in groups per staff.  Staff working on placement at [facility].  Reports that his mood has been improving during his stay.  Tolerating current medication regimen without issues.  Suicidal ideation improving.  No overt behavioral issues reported by staff.       12/19/24  Today patient states that he is feeling better. Attending and participating in groups per staff. Patient was accepted to [facility] for rehab. Reports that his mood has continued to improve during his stay. Denies suicidal ideation. Tolerating current medication regimen without issues.  No overt behavioral issues reported by staff. Will continue with current POC and proceed with planned discharge to rehab tomorrow.         Current Medications:   Scheduled Meds:        DISCHARGE EXAMINATION    VITALS   Vitals:    12/19/24 0700 12/19/24 0701 12/19/24 1910 12/20/24 0701   BP: (!) 158/100 (!) 158/100 (!) 169/92 (!) 153/92   Pulse: 87 87  77   Resp: 16 16  18   Temp: 98.8 °F  "(37.1 °C) 98.8 °F (37.1 °C)  98.1 °F (36.7 °C)   TempSrc:       SpO2: 97%   95%   Weight:       Height:             Discharge Mental Status Exam:  General Appearance: appears stated age, well-developed, well-nourished  Arousal: alert  Behavior: cooperative  Movements and Motor Activity: no abnormal involuntary movements noted  Orientation: oriented to person, place, time, and situation  Speech: normal rate, normal rhythm, normal volume, normal tone  Mood: "Pretty good"  Affect: constricted  Thought Process: linear  Associations: intact  Thought Content and Perceptions: denies suicidal ideation, no homicidal ideation, no auditory hallucinations, no visual hallucinations, no paranoid ideation, no ideas of reference  Recent and Remote Memory: recent memory intact, remote memory intact; per interview/observation with patient  Attention and Concentration: intact, attentive to conversation; per interview/observation with patient  Fund of Knowledge: intact, aware of current events, vocabulary appropriate; based on history, vocabulary, fund of knowledge, syntax, grammar, and content  Insight: questionable; based on understanding of severity of illness and HPI  Judgment: questionable; based on patient's behavior and HPI      Discharge Condition:  Stable    Prognosis:  Fair    Justification for multiple antipsychotics:  N/a    Disposition:  discharged to substance treatment program    Follow-up:   Follow-up Information       [facility] Follow up.    Contact information:  TAMMIE Conner  [phone]-fax                           Medication Regimen:  No current facility-administered medications for this encounter.    Current Outpatient Medications:     amLODIPine (NORVASC) 5 MG tablet, Take 1 tablet (5 mg total) by mouth once daily., Disp: 30 tablet, Rfl: 0    buPROPion (WELLBUTRIN XL) 150 MG TB24 tablet, Take 1 tablet (150 mg total) by mouth once daily., Disp: 30 tablet, Rfl: " 0      Patient Instructions:   Continue medication regimen as prescribed.    Disposition plan per  - see  notes for details.    Patient instructed to call 911 or present to emergency department if any of the following complications develop status post discharge: suicidality, homicidality, or grave disability.     Total time spent discharging patient: <30 minutes      Boogie Lees M.D.